# Patient Record
Sex: MALE | Race: WHITE | Employment: UNEMPLOYED
[De-identification: names, ages, dates, MRNs, and addresses within clinical notes are randomized per-mention and may not be internally consistent; named-entity substitution may affect disease eponyms.]

---

## 2024-10-16 ENCOUNTER — APPOINTMENT (OUTPATIENT)
Facility: HOSPITAL | Age: 64
DRG: 242 | End: 2024-10-16
Payer: MEDICAID

## 2024-10-16 ENCOUNTER — HOSPITAL ENCOUNTER (INPATIENT)
Facility: HOSPITAL | Age: 64
LOS: 20 days | Discharge: NURSING, REHAB OR LONG-TERM CARE | DRG: 242 | End: 2024-11-05
Attending: STUDENT IN AN ORGANIZED HEALTH CARE EDUCATION/TRAINING PROGRAM | Admitting: INTERNAL MEDICINE
Payer: MEDICAID

## 2024-10-16 DIAGNOSIS — N17.9 ACUTE RENAL FAILURE, UNSPECIFIED ACUTE RENAL FAILURE TYPE (HCC): ICD-10-CM
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
## 2024-10-16 DIAGNOSIS — F10.90 ALCOHOL USE DISORDER: ICD-10-CM

## 2024-10-16 DIAGNOSIS — R41.82 ALTERED MENTAL STATUS, UNSPECIFIED ALTERED MENTAL STATUS TYPE: ICD-10-CM

## 2024-10-16 PROBLEM — R56.9 ALCOHOL WITHDRAWAL SEIZURE WITH DELIRIUM (HCC): Status: ACTIVE | Noted: 2024-10-16

## 2024-10-16 PROBLEM — F10.931 ALCOHOL WITHDRAWAL SEIZURE WITH DELIRIUM (HCC): Status: ACTIVE | Noted: 2024-10-16

## 2024-10-16 LAB
ALBUMIN SERPL-MCNC: 3.6 G/DL (ref 3.5–5)
ALBUMIN/GLOB SERPL: 0.9 (ref 1.1–2.2)
ALP SERPL-CCNC: 66 U/L (ref 45–117)
ALT SERPL-CCNC: 28 U/L (ref 12–78)
AMMONIA PLAS-SCNC: 16 UMOL/L
ANION GAP BLD CALC-SCNC: 17 (ref 10–20)
ANION GAP SERPL CALC-SCNC: 25 MMOL/L (ref 2–12)
AST SERPL-CCNC: 35 U/L (ref 15–37)
BASE EXCESS BLD CALC-SCNC: 4.1 MMOL/L
BASOPHILS # BLD: 0 K/UL (ref 0–0.1)
BASOPHILS NFR BLD: 0 % (ref 0–1)
BILIRUB SERPL-MCNC: 1.3 MG/DL (ref 0.2–1)
BUN SERPL-MCNC: 132 MG/DL (ref 6–20)
BUN/CREAT SERPL: 32 (ref 12–20)
CA-I BLD-MCNC: 0.87 MMOL/L (ref 1.15–1.33)
CALCIUM SERPL-MCNC: 9.7 MG/DL (ref 8.5–10.1)
CHLORIDE BLD-SCNC: 91 MMOL/L (ref 100–111)
CHLORIDE SERPL-SCNC: 83 MMOL/L (ref 97–108)
CK SERPL-CCNC: 508 U/L (ref 39–308)
CO2 BLD-SCNC: 26 MMOL/L (ref 22–29)
CO2 SERPL-SCNC: 28 MMOL/L (ref 21–32)
CREAT SERPL-MCNC: 4.11 MG/DL (ref 0.7–1.3)
CREAT UR-MCNC: 2.7 MG/DL (ref 0.6–1.3)
DIFFERENTIAL METHOD BLD: ABNORMAL
EOSINOPHIL # BLD: 0 K/UL (ref 0–0.4)
EOSINOPHIL NFR BLD: 0 % (ref 0–7)
ERYTHROCYTE [DISTWIDTH] IN BLOOD BY AUTOMATED COUNT: 12.4 % (ref 11.5–14.5)
ETHANOL SERPL-MCNC: <10 MG/DL (ref 0–0.08)
GLOBULIN SER CALC-MCNC: 4 G/DL (ref 2–4)
GLUCOSE BLD STRIP.AUTO-MCNC: 229 MG/DL (ref 74–99)
GLUCOSE SERPL-MCNC: 242 MG/DL (ref 65–100)
HCO3 BLDA-SCNC: 26 MMOL/L
HCT VFR BLD AUTO: 32.8 % (ref 36.6–50.3)
HGB BLD-MCNC: 11.6 G/DL (ref 12.1–17)
IMM GRANULOCYTES # BLD AUTO: 0.3 K/UL (ref 0–0.04)
IMM GRANULOCYTES NFR BLD AUTO: 2 % (ref 0–0.5)
INR PPP: 1.4 (ref 0.9–1.1)
LACTATE BLD-SCNC: 14.27 MMOL/L (ref 0.4–2)
LACTATE BLD-SCNC: 3.04 MMOL/L (ref 0.4–2)
LACTATE BLD-SCNC: 7.78 MMOL/L (ref 0.4–2)
LIPASE SERPL-CCNC: 128 U/L (ref 13–75)
LYMPHOCYTES # BLD: 1.5 K/UL (ref 0.8–3.5)
LYMPHOCYTES NFR BLD: 9 % (ref 12–49)
MAGNESIUM SERPL-MCNC: 3.4 MG/DL (ref 1.6–2.4)
MCH RBC QN AUTO: 32 PG (ref 26–34)
MCHC RBC AUTO-ENTMCNC: 35.4 G/DL (ref 30–36.5)
MCV RBC AUTO: 90.6 FL (ref 80–99)
MONOCYTES # BLD: 0.5 K/UL (ref 0–1)
MONOCYTES NFR BLD: 3 % (ref 5–13)
NEUTS SEG # BLD: 14.6 K/UL (ref 1.8–8)
NEUTS SEG NFR BLD: 86 % (ref 32–75)
NRBC # BLD: 0.06 K/UL (ref 0–0.01)
NRBC BLD-RTO: 0.4 PER 100 WBC
NT PRO BNP: 3591 PG/ML
PCO2 BLDV: 31.2 MMHG (ref 41–51)
PH BLDV: 7.54 (ref 7.32–7.42)
PLATELET # BLD AUTO: 154 K/UL (ref 150–400)
PMV BLD AUTO: 11.3 FL (ref 8.9–12.9)
PO2 BLDV: <27 MMHG (ref 25–40)
POTASSIUM BLD-SCNC: 2.1 MMOL/L (ref 3.5–5.5)
POTASSIUM SERPL-SCNC: 2.1 MMOL/L (ref 3.5–5.1)
PROT SERPL-MCNC: 7.6 G/DL (ref 6.4–8.2)
PROTHROMBIN TIME: 14.7 SEC (ref 9–11.1)
RBC # BLD AUTO: 3.62 M/UL (ref 4.1–5.7)
SODIUM BLD-SCNC: 134 MMOL/L (ref 136–145)
SODIUM SERPL-SCNC: 136 MMOL/L (ref 136–145)
SPECIMEN SITE: ABNORMAL
TROPONIN I SERPL HS-MCNC: 56 NG/L (ref 0–76)
WBC # BLD AUTO: 16.9 K/UL (ref 4.1–11.1)

## 2024-10-16 PROCEDURE — 2500000003 HC RX 250 WO HCPCS: Performed by: NURSE PRACTITIONER

## 2024-10-16 PROCEDURE — 96365 THER/PROPH/DIAG IV INF INIT: CPT

## 2024-10-16 PROCEDURE — 74176 CT ABD & PELVIS W/O CONTRAST: CPT

## 2024-10-16 PROCEDURE — 80053 COMPREHEN METABOLIC PANEL: CPT

## 2024-10-16 PROCEDURE — 85610 PROTHROMBIN TIME: CPT

## 2024-10-16 PROCEDURE — 82947 ASSAY GLUCOSE BLOOD QUANT: CPT

## 2024-10-16 PROCEDURE — 84145 PROCALCITONIN (PCT): CPT

## 2024-10-16 PROCEDURE — 72125 CT NECK SPINE W/O DYE: CPT

## 2024-10-16 PROCEDURE — 82550 ASSAY OF CK (CPK): CPT

## 2024-10-16 PROCEDURE — 84295 ASSAY OF SERUM SODIUM: CPT

## 2024-10-16 PROCEDURE — 82803 BLOOD GASES ANY COMBINATION: CPT

## 2024-10-16 PROCEDURE — 71045 X-RAY EXAM CHEST 1 VIEW: CPT

## 2024-10-16 PROCEDURE — 82140 ASSAY OF AMMONIA: CPT

## 2024-10-16 PROCEDURE — 83735 ASSAY OF MAGNESIUM: CPT

## 2024-10-16 PROCEDURE — 6360000002 HC RX W HCPCS: Performed by: NURSE PRACTITIONER

## 2024-10-16 PROCEDURE — 70450 CT HEAD/BRAIN W/O DYE: CPT

## 2024-10-16 PROCEDURE — 36415 COLL VENOUS BLD VENIPUNCTURE: CPT

## 2024-10-16 PROCEDURE — 84484 ASSAY OF TROPONIN QUANT: CPT

## 2024-10-16 PROCEDURE — 82077 ASSAY SPEC XCP UR&BREATH IA: CPT

## 2024-10-16 PROCEDURE — 99285 EMERGENCY DEPT VISIT HI MDM: CPT

## 2024-10-16 PROCEDURE — 83880 ASSAY OF NATRIURETIC PEPTIDE: CPT

## 2024-10-16 PROCEDURE — 1100000000 HC RM PRIVATE

## 2024-10-16 PROCEDURE — 93005 ELECTROCARDIOGRAM TRACING: CPT | Performed by: STUDENT IN AN ORGANIZED HEALTH CARE EDUCATION/TRAINING PROGRAM

## 2024-10-16 PROCEDURE — 2580000003 HC RX 258: Performed by: STUDENT IN AN ORGANIZED HEALTH CARE EDUCATION/TRAINING PROGRAM

## 2024-10-16 PROCEDURE — 83690 ASSAY OF LIPASE: CPT

## 2024-10-16 PROCEDURE — 96375 TX/PRO/DX INJ NEW DRUG ADDON: CPT

## 2024-10-16 PROCEDURE — 84132 ASSAY OF SERUM POTASSIUM: CPT

## 2024-10-16 PROCEDURE — 83605 ASSAY OF LACTIC ACID: CPT

## 2024-10-16 PROCEDURE — 6370000000 HC RX 637 (ALT 250 FOR IP): Performed by: STUDENT IN AN ORGANIZED HEALTH CARE EDUCATION/TRAINING PROGRAM

## 2024-10-16 PROCEDURE — 87040 BLOOD CULTURE FOR BACTERIA: CPT

## 2024-10-16 PROCEDURE — 82330 ASSAY OF CALCIUM: CPT

## 2024-10-16 PROCEDURE — 85025 COMPLETE CBC W/AUTO DIFF WBC: CPT

## 2024-10-16 PROCEDURE — 6360000002 HC RX W HCPCS: Performed by: STUDENT IN AN ORGANIZED HEALTH CARE EDUCATION/TRAINING PROGRAM

## 2024-10-16 RX ORDER — PHENOBARBITAL SODIUM 65 MG/ML
32.5 INJECTION, SOLUTION INTRAMUSCULAR; INTRAVENOUS EVERY 6 HOURS PRN
Status: DISPENSED | OUTPATIENT
Start: 2024-10-16 | End: 2024-10-18

## 2024-10-16 RX ORDER — SODIUM CHLORIDE 9 MG/ML
INJECTION, SOLUTION INTRAVENOUS PRN
Status: DISCONTINUED | OUTPATIENT
Start: 2024-10-16 | End: 2024-11-04 | Stop reason: SDUPTHER

## 2024-10-16 RX ORDER — THIAMINE HYDROCHLORIDE 100 MG/ML
500 INJECTION, SOLUTION INTRAMUSCULAR; INTRAVENOUS EVERY 8 HOURS
Status: COMPLETED | OUTPATIENT
Start: 2024-10-16 | End: 2024-10-18

## 2024-10-16 RX ORDER — THIAMINE HYDROCHLORIDE 100 MG/ML
250 INJECTION, SOLUTION INTRAMUSCULAR; INTRAVENOUS EVERY 24 HOURS
Status: COMPLETED | OUTPATIENT
Start: 2024-10-18 | End: 2024-10-22

## 2024-10-16 RX ORDER — SODIUM CHLORIDE, SODIUM LACTATE, POTASSIUM CHLORIDE, AND CALCIUM CHLORIDE .6; .31; .03; .02 G/100ML; G/100ML; G/100ML; G/100ML
1000 INJECTION, SOLUTION INTRAVENOUS ONCE
Status: COMPLETED | OUTPATIENT
Start: 2024-10-16 | End: 2024-10-16

## 2024-10-16 RX ORDER — ACETAMINOPHEN 650 MG/1
650 SUPPOSITORY RECTAL EVERY 6 HOURS PRN
Status: DISCONTINUED | OUTPATIENT
Start: 2024-10-16 | End: 2024-11-05 | Stop reason: HOSPADM

## 2024-10-16 RX ORDER — PHENOBARBITAL 32.4 MG/1
32.4 TABLET ORAL EVERY 6 HOURS PRN
Status: DISCONTINUED | OUTPATIENT
Start: 2024-10-16 | End: 2024-10-16

## 2024-10-16 RX ORDER — PHENOBARBITAL SODIUM 65 MG/ML
16.2 INJECTION, SOLUTION INTRAMUSCULAR; INTRAVENOUS EVERY 6 HOURS PRN
Status: DISPENSED | OUTPATIENT
Start: 2024-10-18 | End: 2024-10-20

## 2024-10-16 RX ORDER — ACETAMINOPHEN 325 MG/1
650 TABLET ORAL EVERY 6 HOURS PRN
Status: DISCONTINUED | OUTPATIENT
Start: 2024-10-16 | End: 2024-11-05 | Stop reason: HOSPADM

## 2024-10-16 RX ORDER — DEXTROSE MONOHYDRATE, SODIUM CHLORIDE, AND POTASSIUM CHLORIDE 50; 1.49; 4.5 G/1000ML; G/1000ML; G/1000ML
INJECTION, SOLUTION INTRAVENOUS CONTINUOUS
Status: DISCONTINUED | OUTPATIENT
Start: 2024-10-16 | End: 2024-10-17

## 2024-10-16 RX ORDER — M-VIT,TX,IRON,MINS/CALC/FOLIC 27MG-0.4MG
1 TABLET ORAL DAILY
Status: DISCONTINUED | OUTPATIENT
Start: 2024-10-17 | End: 2024-11-05 | Stop reason: HOSPADM

## 2024-10-16 RX ORDER — ONDANSETRON 2 MG/ML
4 INJECTION INTRAMUSCULAR; INTRAVENOUS EVERY 6 HOURS PRN
Status: DISCONTINUED | OUTPATIENT
Start: 2024-10-16 | End: 2024-11-05 | Stop reason: HOSPADM

## 2024-10-16 RX ORDER — ENOXAPARIN SODIUM 100 MG/ML
30 INJECTION SUBCUTANEOUS DAILY
Status: DISCONTINUED | OUTPATIENT
Start: 2024-10-17 | End: 2024-11-03

## 2024-10-16 RX ORDER — SODIUM CHLORIDE 0.9 % (FLUSH) 0.9 %
5-40 SYRINGE (ML) INJECTION PRN
Status: DISCONTINUED | OUTPATIENT
Start: 2024-10-16 | End: 2024-10-25

## 2024-10-16 RX ORDER — POTASSIUM CHLORIDE 7.45 MG/ML
10 INJECTION INTRAVENOUS
Status: COMPLETED | OUTPATIENT
Start: 2024-10-16 | End: 2024-10-16

## 2024-10-16 RX ORDER — PHENOBARBITAL 32.4 MG/1
16.2 TABLET ORAL EVERY 6 HOURS PRN
Status: DISCONTINUED | OUTPATIENT
Start: 2024-10-18 | End: 2024-10-16

## 2024-10-16 RX ORDER — SODIUM CHLORIDE 0.9 % (FLUSH) 0.9 %
5-40 SYRINGE (ML) INJECTION EVERY 12 HOURS SCHEDULED
Status: DISCONTINUED | OUTPATIENT
Start: 2024-10-16 | End: 2024-10-25

## 2024-10-16 RX ORDER — 0.9 % SODIUM CHLORIDE 0.9 %
1000 INTRAVENOUS SOLUTION INTRAVENOUS ONCE
Status: COMPLETED | OUTPATIENT
Start: 2024-10-16 | End: 2024-10-16

## 2024-10-16 RX ORDER — POLYETHYLENE GLYCOL 3350 17 G/17G
17 POWDER, FOR SOLUTION ORAL DAILY PRN
Status: DISCONTINUED | OUTPATIENT
Start: 2024-10-16 | End: 2024-11-05 | Stop reason: HOSPADM

## 2024-10-16 RX ORDER — FOLIC ACID 1 MG/1
1 TABLET ORAL DAILY
Status: DISCONTINUED | OUTPATIENT
Start: 2024-10-17 | End: 2024-11-05 | Stop reason: HOSPADM

## 2024-10-16 RX ORDER — GAUZE BANDAGE 2" X 2"
100 BANDAGE TOPICAL DAILY
Status: DISCONTINUED | OUTPATIENT
Start: 2024-10-23 | End: 2024-11-05 | Stop reason: HOSPADM

## 2024-10-16 RX ORDER — ONDANSETRON 4 MG/1
4 TABLET, ORALLY DISINTEGRATING ORAL EVERY 8 HOURS PRN
Status: DISCONTINUED | OUTPATIENT
Start: 2024-10-16 | End: 2024-11-05 | Stop reason: HOSPADM

## 2024-10-16 RX ORDER — POTASSIUM CHLORIDE 1500 MG/1
40 TABLET, EXTENDED RELEASE ORAL ONCE
Status: DISCONTINUED | OUTPATIENT
Start: 2024-10-16 | End: 2024-10-17

## 2024-10-16 RX ORDER — SODIUM CHLORIDE, SODIUM LACTATE, POTASSIUM CHLORIDE, AND CALCIUM CHLORIDE .6; .31; .03; .02 G/100ML; G/100ML; G/100ML; G/100ML
1000 INJECTION, SOLUTION INTRAVENOUS ONCE
Status: DISCONTINUED | OUTPATIENT
Start: 2024-10-16 | End: 2024-10-16

## 2024-10-16 RX ADMIN — POTASSIUM CHLORIDE 10 MEQ: 7.46 INJECTION, SOLUTION INTRAVENOUS at 20:47

## 2024-10-16 RX ADMIN — WATER 2000 MG: 1 INJECTION INTRAMUSCULAR; INTRAVENOUS; SUBCUTANEOUS at 19:24

## 2024-10-16 RX ADMIN — POTASSIUM CHLORIDE 10 MEQ: 7.46 INJECTION, SOLUTION INTRAVENOUS at 21:46

## 2024-10-16 RX ADMIN — POTASSIUM CHLORIDE 10 MEQ: 7.46 INJECTION, SOLUTION INTRAVENOUS at 22:48

## 2024-10-16 RX ADMIN — POTASSIUM CHLORIDE 10 MEQ: 7.46 INJECTION, SOLUTION INTRAVENOUS at 23:49

## 2024-10-16 RX ADMIN — THIAMINE HYDROCHLORIDE 500 MG: 100 INJECTION, SOLUTION INTRAMUSCULAR; INTRAVENOUS at 22:52

## 2024-10-16 RX ADMIN — SODIUM CHLORIDE 1000 ML: 9 INJECTION, SOLUTION INTRAVENOUS at 19:39

## 2024-10-16 RX ADMIN — POTASSIUM CHLORIDE, DEXTROSE MONOHYDRATE AND SODIUM CHLORIDE: 150; 5; 450 INJECTION, SOLUTION INTRAVENOUS at 22:50

## 2024-10-16 RX ADMIN — SODIUM CHLORIDE, POTASSIUM CHLORIDE, SODIUM LACTATE AND CALCIUM CHLORIDE 1000 ML: 600; 310; 30; 20 INJECTION, SOLUTION INTRAVENOUS at 19:17

## 2024-10-16 NOTE — ED NOTES
Second bag of Lactated Ringer's switched to Normal Saline because EMS had already spiked a bag of NS and started administering it. This was discussed with Dr. Harvey and agreed to. This was due to fluid supply issues.

## 2024-10-17 PROBLEM — E43 SEVERE PROTEIN-CALORIE MALNUTRITION (HCC): Status: ACTIVE | Noted: 2024-10-17

## 2024-10-17 LAB
ALBUMIN SERPL-MCNC: 2.3 G/DL (ref 3.5–5)
ALBUMIN SERPL-MCNC: 2.4 G/DL (ref 3.5–5)
ALBUMIN SERPL-MCNC: 2.4 G/DL (ref 3.5–5)
ALBUMIN SERPL-MCNC: 2.6 G/DL (ref 3.5–5)
ALBUMIN SERPL-MCNC: 2.7 G/DL (ref 3.5–5)
ALBUMIN/GLOB SERPL: 0.8 (ref 1.1–2.2)
ALP SERPL-CCNC: 40 U/L (ref 45–117)
ALP SERPL-CCNC: 42 U/L (ref 45–117)
ALP SERPL-CCNC: 45 U/L (ref 45–117)
ALP SERPL-CCNC: 47 U/L (ref 45–117)
ALP SERPL-CCNC: 48 U/L (ref 45–117)
ALT SERPL-CCNC: 19 U/L (ref 12–78)
ALT SERPL-CCNC: 19 U/L (ref 12–78)
ALT SERPL-CCNC: 20 U/L (ref 12–78)
ALT SERPL-CCNC: 21 U/L (ref 12–78)
ALT SERPL-CCNC: 22 U/L (ref 12–78)
AMPHET UR QL SCN: NEGATIVE
ANION GAP SERPL CALC-SCNC: 11 MMOL/L (ref 2–12)
ANION GAP SERPL CALC-SCNC: 13 MMOL/L (ref 2–12)
ANION GAP SERPL CALC-SCNC: 5 MMOL/L (ref 2–12)
ANION GAP SERPL CALC-SCNC: 5 MMOL/L (ref 2–12)
ANION GAP SERPL CALC-SCNC: 7 MMOL/L (ref 2–12)
APPEARANCE UR: ABNORMAL
AST SERPL-CCNC: 29 U/L (ref 15–37)
AST SERPL-CCNC: 31 U/L (ref 15–37)
AST SERPL-CCNC: 32 U/L (ref 15–37)
AST SERPL-CCNC: 33 U/L (ref 15–37)
AST SERPL-CCNC: 35 U/L (ref 15–37)
BACTERIA URNS QL MICRO: NEGATIVE /HPF
BARBITURATES UR QL SCN: NEGATIVE
BASOPHILS # BLD: 0 K/UL (ref 0–0.1)
BASOPHILS NFR BLD: 0 % (ref 0–1)
BENZODIAZ UR QL: NEGATIVE
BILIRUB SERPL-MCNC: 0.8 MG/DL (ref 0.2–1)
BILIRUB SERPL-MCNC: 0.9 MG/DL (ref 0.2–1)
BILIRUB SERPL-MCNC: 0.9 MG/DL (ref 0.2–1)
BILIRUB SERPL-MCNC: 1 MG/DL (ref 0.2–1)
BILIRUB SERPL-MCNC: 1.1 MG/DL (ref 0.2–1)
BILIRUB UR QL: NEGATIVE
BUN SERPL-MCNC: 117 MG/DL (ref 6–20)
BUN SERPL-MCNC: 119 MG/DL (ref 6–20)
BUN SERPL-MCNC: 123 MG/DL (ref 6–20)
BUN SERPL-MCNC: 124 MG/DL (ref 6–20)
BUN SERPL-MCNC: 137 MG/DL (ref 6–20)
BUN/CREAT SERPL: 37 (ref 12–20)
BUN/CREAT SERPL: 37 (ref 12–20)
BUN/CREAT SERPL: 38 (ref 12–20)
CALCIUM SERPL-MCNC: 7.6 MG/DL (ref 8.5–10.1)
CALCIUM SERPL-MCNC: 7.6 MG/DL (ref 8.5–10.1)
CALCIUM SERPL-MCNC: 7.7 MG/DL (ref 8.5–10.1)
CALCIUM SERPL-MCNC: 7.8 MG/DL (ref 8.5–10.1)
CALCIUM SERPL-MCNC: 8.2 MG/DL (ref 8.5–10.1)
CANNABINOIDS UR QL SCN: NEGATIVE
CHLORIDE SERPL-SCNC: 100 MMOL/L (ref 97–108)
CHLORIDE SERPL-SCNC: 101 MMOL/L (ref 97–108)
CHLORIDE SERPL-SCNC: 90 MMOL/L (ref 97–108)
CHLORIDE SERPL-SCNC: 96 MMOL/L (ref 97–108)
CHLORIDE SERPL-SCNC: 99 MMOL/L (ref 97–108)
CO2 SERPL-SCNC: 33 MMOL/L (ref 21–32)
CO2 SERPL-SCNC: 33 MMOL/L (ref 21–32)
CO2 SERPL-SCNC: 34 MMOL/L (ref 21–32)
CO2 SERPL-SCNC: 35 MMOL/L (ref 21–32)
CO2 SERPL-SCNC: 36 MMOL/L (ref 21–32)
COCAINE UR QL SCN: NEGATIVE
COLOR UR: ABNORMAL
CREAT SERPL-MCNC: 3.07 MG/DL (ref 0.7–1.3)
CREAT SERPL-MCNC: 3.13 MG/DL (ref 0.7–1.3)
CREAT SERPL-MCNC: 3.26 MG/DL (ref 0.7–1.3)
CREAT SERPL-MCNC: 3.39 MG/DL (ref 0.7–1.3)
CREAT SERPL-MCNC: 3.66 MG/DL (ref 0.7–1.3)
DIFFERENTIAL METHOD BLD: ABNORMAL
EOSINOPHIL # BLD: 0 K/UL (ref 0–0.4)
EOSINOPHIL NFR BLD: 0 % (ref 0–7)
EPITH CASTS URNS QL MICRO: ABNORMAL /LPF
ERYTHROCYTE [DISTWIDTH] IN BLOOD BY AUTOMATED COUNT: 12.4 % (ref 11.5–14.5)
GLOBULIN SER CALC-MCNC: 2.9 G/DL (ref 2–4)
GLOBULIN SER CALC-MCNC: 2.9 G/DL (ref 2–4)
GLOBULIN SER CALC-MCNC: 3.2 G/DL (ref 2–4)
GLOBULIN SER CALC-MCNC: 3.2 G/DL (ref 2–4)
GLOBULIN SER CALC-MCNC: 3.3 G/DL (ref 2–4)
GLUCOSE BLD STRIP.AUTO-MCNC: 112 MG/DL (ref 65–117)
GLUCOSE BLD STRIP.AUTO-MCNC: 121 MG/DL (ref 65–117)
GLUCOSE BLD STRIP.AUTO-MCNC: 156 MG/DL (ref 65–117)
GLUCOSE BLD STRIP.AUTO-MCNC: 189 MG/DL (ref 65–117)
GLUCOSE BLD STRIP.AUTO-MCNC: 199 MG/DL (ref 65–117)
GLUCOSE SERPL-MCNC: 107 MG/DL (ref 65–100)
GLUCOSE SERPL-MCNC: 111 MG/DL (ref 65–100)
GLUCOSE SERPL-MCNC: 124 MG/DL (ref 65–100)
GLUCOSE SERPL-MCNC: 164 MG/DL (ref 65–100)
GLUCOSE SERPL-MCNC: 204 MG/DL (ref 65–100)
GLUCOSE UR STRIP.AUTO-MCNC: NEGATIVE MG/DL
GRAN CASTS URNS QL MICRO: ABNORMAL /LPF
HCT VFR BLD AUTO: 22 % (ref 36.6–50.3)
HCT VFR BLD AUTO: 23.9 % (ref 36.6–50.3)
HGB BLD-MCNC: 7.8 G/DL (ref 12.1–17)
HGB BLD-MCNC: 8.4 G/DL (ref 12.1–17)
HGB UR QL STRIP: ABNORMAL
HYALINE CASTS URNS QL MICRO: ABNORMAL /LPF (ref 0–5)
IMM GRANULOCYTES # BLD AUTO: 0.1 K/UL (ref 0–0.04)
IMM GRANULOCYTES NFR BLD AUTO: 1 % (ref 0–0.5)
KETONES UR QL STRIP.AUTO: NEGATIVE MG/DL
LACTATE BLD-SCNC: 1.57 MMOL/L (ref 0.4–2)
LEUKOCYTE ESTERASE UR QL STRIP.AUTO: ABNORMAL
LYMPHOCYTES # BLD: 1.4 K/UL (ref 0.8–3.5)
LYMPHOCYTES NFR BLD: 11 % (ref 12–49)
Lab: NORMAL
MAGNESIUM SERPL-MCNC: 2.3 MG/DL (ref 1.6–2.4)
MAGNESIUM SERPL-MCNC: 2.6 MG/DL (ref 1.6–2.4)
MCH RBC QN AUTO: 31.8 PG (ref 26–34)
MCHC RBC AUTO-ENTMCNC: 35.1 G/DL (ref 30–36.5)
MCV RBC AUTO: 90.5 FL (ref 80–99)
METHADONE UR QL: NEGATIVE
MONOCYTES # BLD: 0.3 K/UL (ref 0–1)
MONOCYTES NFR BLD: 3 % (ref 5–13)
NEUTS SEG # BLD: 10.6 K/UL (ref 1.8–8)
NEUTS SEG NFR BLD: 85 % (ref 32–75)
NITRITE UR QL STRIP.AUTO: NEGATIVE
NRBC # BLD: 0.02 K/UL (ref 0–0.01)
NRBC BLD-RTO: 0.2 PER 100 WBC
OPIATES UR QL: NEGATIVE
OTHER: ABNORMAL
PCP UR QL: NEGATIVE
PH UR STRIP: 5 (ref 5–8)
PHOSPHATE SERPL-MCNC: 2 MG/DL (ref 2.6–4.7)
PHOSPHATE SERPL-MCNC: 2.6 MG/DL (ref 2.6–4.7)
PLATELET # BLD AUTO: 122 K/UL (ref 150–400)
PMV BLD AUTO: 11.2 FL (ref 8.9–12.9)
POTASSIUM SERPL-SCNC: 2.4 MMOL/L (ref 3.5–5.1)
POTASSIUM SERPL-SCNC: 2.4 MMOL/L (ref 3.5–5.1)
POTASSIUM SERPL-SCNC: 3 MMOL/L (ref 3.5–5.1)
POTASSIUM SERPL-SCNC: 3.7 MMOL/L (ref 3.5–5.1)
POTASSIUM SERPL-SCNC: 4 MMOL/L (ref 3.5–5.1)
PROCALCITONIN SERPL-MCNC: 1.09 NG/ML
PROT SERPL-MCNC: 5.2 G/DL (ref 6.4–8.2)
PROT SERPL-MCNC: 5.3 G/DL (ref 6.4–8.2)
PROT SERPL-MCNC: 5.6 G/DL (ref 6.4–8.2)
PROT SERPL-MCNC: 5.8 G/DL (ref 6.4–8.2)
PROT SERPL-MCNC: 6 G/DL (ref 6.4–8.2)
PROT UR STRIP-MCNC: 30 MG/DL
RBC # BLD AUTO: 2.64 M/UL (ref 4.1–5.7)
RBC #/AREA URNS HPF: ABNORMAL /HPF (ref 0–5)
SERVICE CMNT-IMP: ABNORMAL
SERVICE CMNT-IMP: NORMAL
SODIUM SERPL-SCNC: 139 MMOL/L (ref 136–145)
SODIUM SERPL-SCNC: 140 MMOL/L (ref 136–145)
SODIUM SERPL-SCNC: 141 MMOL/L (ref 136–145)
SP GR UR REFRACTOMETRY: 1.02
URATE CRY URNS QL MICRO: ABNORMAL
URINE CULTURE IF INDICATED: ABNORMAL
UROBILINOGEN UR QL STRIP.AUTO: 1 EU/DL (ref 0.2–1)
WBC # BLD AUTO: 12.4 K/UL (ref 4.1–11.1)
WBC URNS QL MICRO: ABNORMAL /HPF (ref 0–4)

## 2024-10-17 PROCEDURE — 6360000002 HC RX W HCPCS: Performed by: NURSE PRACTITIONER

## 2024-10-17 PROCEDURE — 6370000000 HC RX 637 (ALT 250 FOR IP): Performed by: INTERNAL MEDICINE

## 2024-10-17 PROCEDURE — 82962 GLUCOSE BLOOD TEST: CPT

## 2024-10-17 PROCEDURE — 81001 URINALYSIS AUTO W/SCOPE: CPT

## 2024-10-17 PROCEDURE — 2500000003 HC RX 250 WO HCPCS: Performed by: NURSE PRACTITIONER

## 2024-10-17 PROCEDURE — 83605 ASSAY OF LACTIC ACID: CPT

## 2024-10-17 PROCEDURE — 83735 ASSAY OF MAGNESIUM: CPT

## 2024-10-17 PROCEDURE — 84100 ASSAY OF PHOSPHORUS: CPT

## 2024-10-17 PROCEDURE — 6370000000 HC RX 637 (ALT 250 FOR IP): Performed by: NURSE PRACTITIONER

## 2024-10-17 PROCEDURE — 36415 COLL VENOUS BLD VENIPUNCTURE: CPT

## 2024-10-17 PROCEDURE — 2580000003 HC RX 258: Performed by: INTERNAL MEDICINE

## 2024-10-17 PROCEDURE — 85014 HEMATOCRIT: CPT

## 2024-10-17 PROCEDURE — 2580000003 HC RX 258: Performed by: NURSE PRACTITIONER

## 2024-10-17 PROCEDURE — 80307 DRUG TEST PRSMV CHEM ANLYZR: CPT

## 2024-10-17 PROCEDURE — 85018 HEMOGLOBIN: CPT

## 2024-10-17 PROCEDURE — 85025 COMPLETE CBC W/AUTO DIFF WBC: CPT

## 2024-10-17 PROCEDURE — 2000000000 HC ICU R&B

## 2024-10-17 PROCEDURE — 80053 COMPREHEN METABOLIC PANEL: CPT

## 2024-10-17 RX ORDER — GLUCAGON 1 MG/ML
1 KIT INJECTION PRN
Status: DISCONTINUED | OUTPATIENT
Start: 2024-10-17 | End: 2024-11-05 | Stop reason: HOSPADM

## 2024-10-17 RX ORDER — INSULIN LISPRO 100 [IU]/ML
0-4 INJECTION, SOLUTION INTRAVENOUS; SUBCUTANEOUS
Status: DISCONTINUED | OUTPATIENT
Start: 2024-10-17 | End: 2024-11-03

## 2024-10-17 RX ORDER — POTASSIUM CHLORIDE 1.5 G/1.58G
40 POWDER, FOR SOLUTION ORAL ONCE
Status: COMPLETED | OUTPATIENT
Start: 2024-10-17 | End: 2024-10-17

## 2024-10-17 RX ORDER — SODIUM CHLORIDE, SODIUM LACTATE, POTASSIUM CHLORIDE, CALCIUM CHLORIDE 600; 310; 30; 20 MG/100ML; MG/100ML; MG/100ML; MG/100ML
INJECTION, SOLUTION INTRAVENOUS CONTINUOUS
Status: DISCONTINUED | OUTPATIENT
Start: 2024-10-17 | End: 2024-10-21

## 2024-10-17 RX ORDER — POTASSIUM CHLORIDE 7.45 MG/ML
10 INJECTION INTRAVENOUS
Status: COMPLETED | OUTPATIENT
Start: 2024-10-17 | End: 2024-10-17

## 2024-10-17 RX ORDER — DEXTROSE MONOHYDRATE 100 MG/ML
INJECTION, SOLUTION INTRAVENOUS CONTINUOUS PRN
Status: DISCONTINUED | OUTPATIENT
Start: 2024-10-17 | End: 2024-11-05 | Stop reason: HOSPADM

## 2024-10-17 RX ORDER — POTASSIUM CHLORIDE 7.45 MG/ML
10 INJECTION INTRAVENOUS
Status: COMPLETED | OUTPATIENT
Start: 2024-10-17 | End: 2024-10-18

## 2024-10-17 RX ORDER — SODIUM CHLORIDE, SODIUM LACTATE, POTASSIUM CHLORIDE, AND CALCIUM CHLORIDE .6; .31; .03; .02 G/100ML; G/100ML; G/100ML; G/100ML
1000 INJECTION, SOLUTION INTRAVENOUS ONCE
Status: COMPLETED | OUTPATIENT
Start: 2024-10-17 | End: 2024-10-17

## 2024-10-17 RX ADMIN — POTASSIUM CHLORIDE 10 MEQ: 10 INJECTION, SOLUTION INTRAVENOUS at 06:25

## 2024-10-17 RX ADMIN — POTASSIUM CHLORIDE 10 MEQ: 7.46 INJECTION, SOLUTION INTRAVENOUS at 23:19

## 2024-10-17 RX ADMIN — SODIUM CHLORIDE, POTASSIUM CHLORIDE, SODIUM LACTATE AND CALCIUM CHLORIDE: 600; 310; 30; 20 INJECTION, SOLUTION INTRAVENOUS at 14:07

## 2024-10-17 RX ADMIN — THIAMINE HYDROCHLORIDE 500 MG: 100 INJECTION, SOLUTION INTRAMUSCULAR; INTRAVENOUS at 06:20

## 2024-10-17 RX ADMIN — PHENOBARBITAL SODIUM 32.5 MG: 65 INJECTION, SOLUTION INTRAMUSCULAR; INTRAVENOUS at 04:52

## 2024-10-17 RX ADMIN — POTASSIUM CHLORIDE 10 MEQ: 10 INJECTION, SOLUTION INTRAVENOUS at 02:34

## 2024-10-17 RX ADMIN — POTASSIUM CHLORIDE 10 MEQ: 10 INJECTION, SOLUTION INTRAVENOUS at 05:20

## 2024-10-17 RX ADMIN — Medication 1 AMPULE: at 20:07

## 2024-10-17 RX ADMIN — POTASSIUM CHLORIDE 10 MEQ: 7.46 INJECTION, SOLUTION INTRAVENOUS at 21:31

## 2024-10-17 RX ADMIN — THIAMINE HYDROCHLORIDE 500 MG: 100 INJECTION, SOLUTION INTRAMUSCULAR; INTRAVENOUS at 13:56

## 2024-10-17 RX ADMIN — SODIUM CHLORIDE, POTASSIUM CHLORIDE, SODIUM LACTATE AND CALCIUM CHLORIDE 1000 ML: 600; 310; 30; 20 INJECTION, SOLUTION INTRAVENOUS at 04:19

## 2024-10-17 RX ADMIN — SODIUM CHLORIDE, PRESERVATIVE FREE 10 ML: 5 INJECTION INTRAVENOUS at 20:08

## 2024-10-17 RX ADMIN — POTASSIUM CHLORIDE 10 MEQ: 10 INJECTION, SOLUTION INTRAVENOUS at 11:57

## 2024-10-17 RX ADMIN — SODIUM CHLORIDE, POTASSIUM CHLORIDE, SODIUM LACTATE AND CALCIUM CHLORIDE: 600; 310; 30; 20 INJECTION, SOLUTION INTRAVENOUS at 20:29

## 2024-10-17 RX ADMIN — SODIUM CHLORIDE, POTASSIUM CHLORIDE, SODIUM LACTATE AND CALCIUM CHLORIDE 1000 ML: 600; 310; 30; 20 INJECTION, SOLUTION INTRAVENOUS at 12:01

## 2024-10-17 RX ADMIN — POTASSIUM CHLORIDE 20 MEQ: 1.5 FOR SOLUTION ORAL at 05:16

## 2024-10-17 RX ADMIN — SODIUM CHLORIDE, PRESERVATIVE FREE 10 ML: 5 INJECTION INTRAVENOUS at 09:09

## 2024-10-17 RX ADMIN — POTASSIUM CHLORIDE 10 MEQ: 10 INJECTION, SOLUTION INTRAVENOUS at 09:06

## 2024-10-17 RX ADMIN — THIAMINE HYDROCHLORIDE 500 MG: 100 INJECTION, SOLUTION INTRAMUSCULAR; INTRAVENOUS at 20:09

## 2024-10-17 RX ADMIN — POTASSIUM CHLORIDE 10 MEQ: 10 INJECTION, SOLUTION INTRAVENOUS at 04:09

## 2024-10-17 RX ADMIN — INSULIN LISPRO 1 UNITS: 100 INJECTION, SOLUTION INTRAVENOUS; SUBCUTANEOUS at 13:00

## 2024-10-17 RX ADMIN — POTASSIUM CHLORIDE, DEXTROSE MONOHYDRATE AND SODIUM CHLORIDE: 150; 5; 450 INJECTION, SOLUTION INTRAVENOUS at 09:21

## 2024-10-17 RX ADMIN — Medication 1 AMPULE: at 09:00

## 2024-10-17 RX ADMIN — ENOXAPARIN SODIUM 30 MG: 100 INJECTION SUBCUTANEOUS at 09:23

## 2024-10-17 RX ADMIN — INSULIN LISPRO 1 UNITS: 100 INJECTION, SOLUTION INTRAVENOUS; SUBCUTANEOUS at 04:40

## 2024-10-17 NOTE — H&P
1530 - CM called  KELLY BRIGGS (Brother/Sister) [phone]  to complete assessment but no answer. Cm left voicemail. CM spoke with the patient's spouse Kelly Galicia [phone] who did not have time to talk but said she would call back to complete assessment.    1545 - KELLY BRIGGS (Brother/Sister) [phone] called back but he did not have much detailed information about the patient's history. CM will follow up with Kelly Galicia [phone]. CM will continue to follow.     Jeannine Vines, LUISAN, RN, [facility]  X [phone]

## 2024-10-17 NOTE — ED NOTES
TRANSFER - OUT REPORT:    Verbal report given to Harmeet on Getachew Durham  being transferred to [facility] for routine progression of patient care       Report consisted of patient's Situation, Background, Assessment and   Recommendations(SBAR).     Information from the following report(s) Nurse Handoff Report, Index, ED Encounter Summary, ED SBAR, Adult Overview, Intake/Output, MAR, Recent Results, Med Rec Status, and Cardiac Rhythm nsr w/ PVCs  was reviewed with the receiving nurse.    [place] Fall Assessment:    Presents to emergency department  because of falls (Syncope, seizure, or loss of consciousness): No  Age > 70: No  Altered Mental Status, Intoxication with alcohol or substance confusion (Disorientation, impaired judgment, poor safety awaremess, or inability to follow instructions): Yes  Impaired Mobility: Ambulates or transfers with assistive devices or assistance; Unable to ambulate or transer.: No  Nursing Judgement: Yes          Lines:   Peripheral IV 10/16/24 Left Cephalic (Active)        Opportunity for questions and clarification was provided.      Patient transported with:  Monitor and Registered Nurse

## 2024-10-17 NOTE — ED PROVIDER NOTES
EMERGENCY DEPARTMENT HISTORY AND PHYSICAL EXAM      Date: 10/16/2024  Patient Name: Getachew Durham    History of Presenting Illness     Chief Complaint   Patient presents with    Altered Mental Status     Pt arrives by EMS for AMS. EMS reports that patient's wife found him on floor at the bottom of the stairs. EMS states that patient reported tripping on first step on the stairs at home. They report AMS increasing in route. Pt's wife reports patient does not take medications and she does not live with the patient.         HPI: History From: Wife, patient, History limited by: Altered mental status   Getachew Durham, 64 y.o. male presents to the ED with cc of found on the ground, altered mental status.  I am able to get some history from his wife, she states that she does not live with him, she found him down on the ground today, he reports that he fell.  He tells me he does not know what happened, he passed out and is not sure any other details of the event.  He is able to tell me his name, otherwise his speech is slow and latent and he is a very poor historian.  He says no when I ask if he has any chest pain, abdominal pain, head pain, vomiting or diarrhea.  He does report poor oral intake recently.  He smokes cigarettes, and states that he drinks alcohol.  He does not tell me how much, however his wife states she believes he drinks at least 1/5 of liquor a day.  No other drug use.  He does not have any known past medical history, however does not have a doctor, and wife states he never goes to the doctor.  He does not take any daily medications.          There are no other complaints, changes, or physical findings at this time.    PCP: No primary care provider on file.    No current facility-administered medications on file prior to encounter.     No current outpatient medications on file prior to encounter.       Past History     Past Medical History:  Past Medical History:   Diagnosis Date    Alcohol abuse

## 2024-10-17 NOTE — H&P
[facility]    ICU TEAM H & P Note    Name: Getachew Durham   : 1960   MRN: [id number]   Date: 10/16/2024        Subjective:     Reason for ICU Admission: This is a 63 y/o M with no known PMHx other than ETOH abuse (1/5/day) and tobacco use who presented to ER on 10/16/24 after his wife (does not live with him) found him at the bottom of the stairs after an unknown downtime. He reported that he fell but is unable to provide details 2/2 confusion. Upon further workup he was noted to have K 2.1, chloride 83, BUN/creat 132/4.11 Ag 25, lactic acid 14.27, BNP 3,591 with WBC 17. He was given 2 liters LR as well as cefepime for concerns of sepsis.   On exam he is disoriented to everything except name and , no ataxia and mild tremors present at finger tips with fingers outstretched. Vitals stable. Orders placed, will admit to ICU for ongoing care 2/2 hypokalemia.    Past Medical History:      has a past medical history of Alcohol abuse.    Past Surgical History:      has no past surgical history on file.    Home Medications:     Prior to Admission medications    Not on File     Allergies/Social/Family History:     No Known Allergies   Social History     Tobacco Use    Smoking status: Not on file    Smokeless tobacco: Not on file   Substance Use Topics    Alcohol use: Not on file      No family history on file.    Review of Systems:   Review of Systems   Unable to perform ROS: Mental status change     Objective:   Vital Signs:  BP (!) 116/91   Pulse 66   Temp 97.6 °F (36.4 °C) (Oral)   Resp 14   Ht 1.803 m (5' 11\")   Wt 55.6 kg (122 lb 9.2 oz)   SpO2 96%   BMI 17.10 kg/m²      Temp (24hrs), Av.6 °F (36.4 °C), Min:97.6 °F (36.4 °C), Max:97.6 °F (36.4 °C)         Intake/Output:     Intake/Output Summary (Last 24 hours) at 10/16/2024 2152  Last data filed at 10/16/2024 2143  Gross per 24 hour   Intake 2090.24 ml   Output --   Net 2090.24 ml       Physical Exam:  Physical Exam  Vitals and nursing

## 2024-10-17 NOTE — INTERDISCIPLINARY ROUNDS
Critical care interdisciplinary rounds today.  Following members present: Case Management, , Clinical Lead, Diabetes Team, Nursing, Nutrition, Pharmacy, and Physician. Family invited to participate.  Plan of care discussed.  See clinical pathway for plan of care and interventions and desired outcomes.    Pt. Remains in restraints due to pulling out lines.     Per Dr. Agarwal, Insert gonzalez catheter for retention.

## 2024-10-18 ENCOUNTER — ANESTHESIA EVENT (OUTPATIENT)
Facility: HOSPITAL | Age: 64
End: 2024-10-18

## 2024-10-18 ENCOUNTER — ANESTHESIA (OUTPATIENT)
Facility: HOSPITAL | Age: 64
End: 2024-10-18

## 2024-10-18 LAB
ALBUMIN SERPL-MCNC: 2.3 G/DL (ref 3.5–5)
ALBUMIN SERPL-MCNC: 2.4 G/DL (ref 3.5–5)
ALBUMIN SERPL-MCNC: 2.4 G/DL (ref 3.5–5)
ALBUMIN/GLOB SERPL: 0.8 (ref 1.1–2.2)
ALBUMIN/GLOB SERPL: 0.9 (ref 1.1–2.2)
ALBUMIN/GLOB SERPL: 0.9 (ref 1.1–2.2)
ALP SERPL-CCNC: 44 U/L (ref 45–117)
ALP SERPL-CCNC: 45 U/L (ref 45–117)
ALP SERPL-CCNC: 48 U/L (ref 45–117)
ALT SERPL-CCNC: 22 U/L (ref 12–78)
ALT SERPL-CCNC: 26 U/L (ref 12–78)
ALT SERPL-CCNC: 27 U/L (ref 12–78)
ANION GAP SERPL CALC-SCNC: 4 MMOL/L (ref 2–12)
ANION GAP SERPL CALC-SCNC: 6 MMOL/L (ref 2–12)
ANION GAP SERPL CALC-SCNC: 8 MMOL/L (ref 2–12)
AST SERPL-CCNC: 41 U/L (ref 15–37)
AST SERPL-CCNC: 43 U/L (ref 15–37)
AST SERPL-CCNC: 44 U/L (ref 15–37)
BASOPHILS # BLD: 0 K/UL (ref 0–0.1)
BASOPHILS # BLD: 0 K/UL (ref 0–0.1)
BASOPHILS NFR BLD: 0 % (ref 0–1)
BASOPHILS NFR BLD: 0 % (ref 0–1)
BILIRUB SERPL-MCNC: 0.7 MG/DL (ref 0.2–1)
BILIRUB SERPL-MCNC: 1 MG/DL (ref 0.2–1)
BILIRUB SERPL-MCNC: 1.2 MG/DL (ref 0.2–1)
BUN SERPL-MCNC: 112 MG/DL (ref 6–20)
BUN SERPL-MCNC: 87 MG/DL (ref 6–20)
BUN SERPL-MCNC: 91 MG/DL (ref 6–20)
BUN/CREAT SERPL: 38 (ref 12–20)
BUN/CREAT SERPL: 39 (ref 12–20)
BUN/CREAT SERPL: 40 (ref 12–20)
CALCIUM SERPL-MCNC: 7.7 MG/DL (ref 8.5–10.1)
CALCIUM SERPL-MCNC: 7.8 MG/DL (ref 8.5–10.1)
CALCIUM SERPL-MCNC: 8.1 MG/DL (ref 8.5–10.1)
CHLORIDE SERPL-SCNC: 104 MMOL/L (ref 97–108)
CHLORIDE SERPL-SCNC: 108 MMOL/L (ref 97–108)
CHLORIDE SERPL-SCNC: 110 MMOL/L (ref 97–108)
CO2 SERPL-SCNC: 29 MMOL/L (ref 21–32)
CO2 SERPL-SCNC: 30 MMOL/L (ref 21–32)
CO2 SERPL-SCNC: 32 MMOL/L (ref 21–32)
CREAT SERPL-MCNC: 2.16 MG/DL (ref 0.7–1.3)
CREAT SERPL-MCNC: 2.31 MG/DL (ref 0.7–1.3)
CREAT SERPL-MCNC: 2.91 MG/DL (ref 0.7–1.3)
DIFFERENTIAL METHOD BLD: ABNORMAL
DIFFERENTIAL METHOD BLD: ABNORMAL
EOSINOPHIL # BLD: 0 K/UL (ref 0–0.4)
EOSINOPHIL # BLD: 0 K/UL (ref 0–0.4)
EOSINOPHIL NFR BLD: 0 % (ref 0–7)
EOSINOPHIL NFR BLD: 0 % (ref 0–7)
ERYTHROCYTE [DISTWIDTH] IN BLOOD BY AUTOMATED COUNT: 12.4 % (ref 11.5–14.5)
ERYTHROCYTE [DISTWIDTH] IN BLOOD BY AUTOMATED COUNT: 14.5 % (ref 11.5–14.5)
GLOBULIN SER CALC-MCNC: 2.6 G/DL (ref 2–4)
GLOBULIN SER CALC-MCNC: 2.7 G/DL (ref 2–4)
GLOBULIN SER CALC-MCNC: 3 G/DL (ref 2–4)
GLUCOSE BLD STRIP.AUTO-MCNC: 108 MG/DL (ref 65–117)
GLUCOSE BLD STRIP.AUTO-MCNC: 110 MG/DL (ref 65–117)
GLUCOSE BLD STRIP.AUTO-MCNC: 119 MG/DL (ref 65–117)
GLUCOSE BLD STRIP.AUTO-MCNC: 128 MG/DL (ref 65–117)
GLUCOSE SERPL-MCNC: 104 MG/DL (ref 65–100)
GLUCOSE SERPL-MCNC: 89 MG/DL (ref 65–100)
GLUCOSE SERPL-MCNC: 98 MG/DL (ref 65–100)
HCT VFR BLD AUTO: 19.8 % (ref 36.6–50.3)
HCT VFR BLD AUTO: 21.7 % (ref 36.6–50.3)
HGB BLD-MCNC: 6.8 G/DL (ref 12.1–17)
HGB BLD-MCNC: 7.5 G/DL (ref 12.1–17)
HISTORY CHECK: NORMAL
IMM GRANULOCYTES # BLD AUTO: 0.1 K/UL (ref 0–0.04)
IMM GRANULOCYTES # BLD AUTO: 0.2 K/UL (ref 0–0.04)
IMM GRANULOCYTES NFR BLD AUTO: 1 % (ref 0–0.5)
IMM GRANULOCYTES NFR BLD AUTO: 2 % (ref 0–0.5)
LYMPHOCYTES # BLD: 1.7 K/UL (ref 0.8–3.5)
LYMPHOCYTES # BLD: 2.3 K/UL (ref 0.8–3.5)
LYMPHOCYTES NFR BLD: 15 % (ref 12–49)
LYMPHOCYTES NFR BLD: 18 % (ref 12–49)
MAGNESIUM SERPL-MCNC: 2.2 MG/DL (ref 1.6–2.4)
MCH RBC QN AUTO: 31 PG (ref 26–34)
MCH RBC QN AUTO: 31.5 PG (ref 26–34)
MCHC RBC AUTO-ENTMCNC: 34.3 G/DL (ref 30–36.5)
MCHC RBC AUTO-ENTMCNC: 34.6 G/DL (ref 30–36.5)
MCV RBC AUTO: 89.7 FL (ref 80–99)
MCV RBC AUTO: 91.7 FL (ref 80–99)
MONOCYTES # BLD: 0.5 K/UL (ref 0–1)
MONOCYTES # BLD: 0.6 K/UL (ref 0–1)
MONOCYTES NFR BLD: 5 % (ref 5–13)
MONOCYTES NFR BLD: 5 % (ref 5–13)
NEUTS SEG # BLD: 8.5 K/UL (ref 1.8–8)
NEUTS SEG # BLD: 9.8 K/UL (ref 1.8–8)
NEUTS SEG NFR BLD: 76 % (ref 32–75)
NEUTS SEG NFR BLD: 78 % (ref 32–75)
NRBC # BLD: 0 K/UL (ref 0–0.01)
NRBC # BLD: 0.02 K/UL (ref 0–0.01)
NRBC BLD-RTO: 0 PER 100 WBC
NRBC BLD-RTO: 0.2 PER 100 WBC
PHOSPHATE SERPL-MCNC: 1.4 MG/DL (ref 2.6–4.7)
PLATELET # BLD AUTO: 100 K/UL (ref 150–400)
PLATELET # BLD AUTO: 85 K/UL (ref 150–400)
PMV BLD AUTO: 11.1 FL (ref 8.9–12.9)
PMV BLD AUTO: 11.4 FL (ref 8.9–12.9)
POTASSIUM SERPL-SCNC: 3.4 MMOL/L (ref 3.5–5.1)
POTASSIUM SERPL-SCNC: 3.4 MMOL/L (ref 3.5–5.1)
POTASSIUM SERPL-SCNC: 3.6 MMOL/L (ref 3.5–5.1)
PROT SERPL-MCNC: 5 G/DL (ref 6.4–8.2)
PROT SERPL-MCNC: 5 G/DL (ref 6.4–8.2)
PROT SERPL-MCNC: 5.4 G/DL (ref 6.4–8.2)
RBC # BLD AUTO: 2.16 M/UL (ref 4.1–5.7)
RBC # BLD AUTO: 2.42 M/UL (ref 4.1–5.7)
RBC MORPH BLD: ABNORMAL
SERVICE CMNT-IMP: ABNORMAL
SERVICE CMNT-IMP: ABNORMAL
SERVICE CMNT-IMP: NORMAL
SERVICE CMNT-IMP: NORMAL
SODIUM SERPL-SCNC: 141 MMOL/L (ref 136–145)
SODIUM SERPL-SCNC: 144 MMOL/L (ref 136–145)
SODIUM SERPL-SCNC: 146 MMOL/L (ref 136–145)
WBC # BLD AUTO: 11 K/UL (ref 4.1–11.1)
WBC # BLD AUTO: 12.8 K/UL (ref 4.1–11.1)

## 2024-10-18 PROCEDURE — 2500000003 HC RX 250 WO HCPCS

## 2024-10-18 PROCEDURE — 51798 US URINE CAPACITY MEASURE: CPT

## 2024-10-18 PROCEDURE — 3700000000 HC ANESTHESIA ATTENDED CARE: Performed by: INTERNAL MEDICINE

## 2024-10-18 PROCEDURE — 80053 COMPREHEN METABOLIC PANEL: CPT

## 2024-10-18 PROCEDURE — 6370000000 HC RX 637 (ALT 250 FOR IP): Performed by: INTERNAL MEDICINE

## 2024-10-18 PROCEDURE — 86850 RBC ANTIBODY SCREEN: CPT

## 2024-10-18 PROCEDURE — 2580000003 HC RX 258: Performed by: INTERNAL MEDICINE

## 2024-10-18 PROCEDURE — 51702 INSERT TEMP BLADDER CATH: CPT

## 2024-10-18 PROCEDURE — 3600007502: Performed by: INTERNAL MEDICINE

## 2024-10-18 PROCEDURE — 0D968ZZ DRAINAGE OF STOMACH, VIA NATURAL OR ARTIFICIAL OPENING ENDOSCOPIC: ICD-10-PCS | Performed by: INTERNAL MEDICINE

## 2024-10-18 PROCEDURE — 86900 BLOOD TYPING SEROLOGIC ABO: CPT

## 2024-10-18 PROCEDURE — 2580000003 HC RX 258

## 2024-10-18 PROCEDURE — 36430 TRANSFUSION BLD/BLD COMPNT: CPT

## 2024-10-18 PROCEDURE — 82962 GLUCOSE BLOOD TEST: CPT

## 2024-10-18 PROCEDURE — 36415 COLL VENOUS BLD VENIPUNCTURE: CPT

## 2024-10-18 PROCEDURE — 86901 BLOOD TYPING SEROLOGIC RH(D): CPT

## 2024-10-18 PROCEDURE — 6360000002 HC RX W HCPCS: Performed by: NURSE PRACTITIONER

## 2024-10-18 PROCEDURE — 6360000002 HC RX W HCPCS: Performed by: INTERNAL MEDICINE

## 2024-10-18 PROCEDURE — 7100000011 HC PHASE II RECOVERY - ADDTL 15 MIN: Performed by: INTERNAL MEDICINE

## 2024-10-18 PROCEDURE — 6370000000 HC RX 637 (ALT 250 FOR IP): Performed by: NURSE PRACTITIONER

## 2024-10-18 PROCEDURE — P9016 RBC LEUKOCYTES REDUCED: HCPCS

## 2024-10-18 PROCEDURE — 2500000003 HC RX 250 WO HCPCS: Performed by: INTERNAL MEDICINE

## 2024-10-18 PROCEDURE — 85025 COMPLETE CBC W/AUTO DIFF WBC: CPT

## 2024-10-18 PROCEDURE — 2709999900 HC NON-CHARGEABLE SUPPLY: Performed by: INTERNAL MEDICINE

## 2024-10-18 PROCEDURE — 6360000002 HC RX W HCPCS

## 2024-10-18 PROCEDURE — 7100000010 HC PHASE II RECOVERY - FIRST 15 MIN: Performed by: INTERNAL MEDICINE

## 2024-10-18 PROCEDURE — 3700000001 HC ADD 15 MINUTES (ANESTHESIA): Performed by: INTERNAL MEDICINE

## 2024-10-18 PROCEDURE — 86923 COMPATIBILITY TEST ELECTRIC: CPT

## 2024-10-18 PROCEDURE — 2000000000 HC ICU R&B

## 2024-10-18 PROCEDURE — 2580000003 HC RX 258: Performed by: NURSE PRACTITIONER

## 2024-10-18 PROCEDURE — 3600007512: Performed by: INTERNAL MEDICINE

## 2024-10-18 PROCEDURE — 83735 ASSAY OF MAGNESIUM: CPT

## 2024-10-18 PROCEDURE — 6370000000 HC RX 637 (ALT 250 FOR IP)

## 2024-10-18 PROCEDURE — 84100 ASSAY OF PHOSPHORUS: CPT

## 2024-10-18 RX ORDER — SODIUM CHLORIDE 9 MG/ML
INJECTION, SOLUTION INTRAVENOUS PRN
Status: DISCONTINUED | OUTPATIENT
Start: 2024-10-18 | End: 2024-11-05 | Stop reason: HOSPADM

## 2024-10-18 RX ORDER — SODIUM CHLORIDE, SODIUM LACTATE, POTASSIUM CHLORIDE, AND CALCIUM CHLORIDE .6; .31; .03; .02 G/100ML; G/100ML; G/100ML; G/100ML
1000 INJECTION, SOLUTION INTRAVENOUS ONCE
Status: COMPLETED | OUTPATIENT
Start: 2024-10-18 | End: 2024-10-18

## 2024-10-18 RX ORDER — SODIUM CHLORIDE, SODIUM LACTATE, POTASSIUM CHLORIDE, CALCIUM CHLORIDE 600; 310; 30; 20 MG/100ML; MG/100ML; MG/100ML; MG/100ML
INJECTION, SOLUTION INTRAVENOUS
Status: DISCONTINUED | OUTPATIENT
Start: 2024-10-18 | End: 2024-10-18 | Stop reason: SDUPTHER

## 2024-10-18 RX ORDER — LIDOCAINE HYDROCHLORIDE 20 MG/ML
INJECTION, SOLUTION EPIDURAL; INFILTRATION; INTRACAUDAL; PERINEURAL
Status: DISCONTINUED | OUTPATIENT
Start: 2024-10-18 | End: 2024-10-18 | Stop reason: SDUPTHER

## 2024-10-18 RX ORDER — POTASSIUM CHLORIDE 7.45 MG/ML
10 INJECTION INTRAVENOUS
Status: DISCONTINUED | OUTPATIENT
Start: 2024-10-18 | End: 2024-10-18

## 2024-10-18 RX ADMIN — SODIUM CHLORIDE, PRESERVATIVE FREE 40 MG: 5 INJECTION INTRAVENOUS at 12:22

## 2024-10-18 RX ADMIN — FOLIC ACID 1 MG: 1 TABLET ORAL at 09:05

## 2024-10-18 RX ADMIN — Medication 1 AMPULE: at 20:28

## 2024-10-18 RX ADMIN — PHENOBARBITAL SODIUM 16.2 MG: 65 INJECTION, SOLUTION INTRAMUSCULAR; INTRAVENOUS at 20:26

## 2024-10-18 RX ADMIN — SODIUM CHLORIDE, POTASSIUM CHLORIDE, SODIUM LACTATE AND CALCIUM CHLORIDE: 600; 310; 30; 20 INJECTION, SOLUTION INTRAVENOUS at 13:43

## 2024-10-18 RX ADMIN — BENZOCAINE 1 EACH: 200 SPRAY DENTAL; ORAL; PERIODONTAL at 13:53

## 2024-10-18 RX ADMIN — SODIUM CHLORIDE, POTASSIUM CHLORIDE, SODIUM LACTATE AND CALCIUM CHLORIDE: 600; 310; 30; 20 INJECTION, SOLUTION INTRAVENOUS at 09:04

## 2024-10-18 RX ADMIN — PROPOFOL 20 MG: 10 INJECTION, EMULSION INTRAVENOUS at 14:00

## 2024-10-18 RX ADMIN — SODIUM CHLORIDE, POTASSIUM CHLORIDE, SODIUM LACTATE AND CALCIUM CHLORIDE: 600; 310; 30; 20 INJECTION, SOLUTION INTRAVENOUS at 12:54

## 2024-10-18 RX ADMIN — PROPOFOL 20 MG: 10 INJECTION, EMULSION INTRAVENOUS at 14:02

## 2024-10-18 RX ADMIN — THIAMINE HYDROCHLORIDE 250 MG: 100 INJECTION, SOLUTION INTRAMUSCULAR; INTRAVENOUS at 20:27

## 2024-10-18 RX ADMIN — PROPOFOL 50 MG: 10 INJECTION, EMULSION INTRAVENOUS at 13:53

## 2024-10-18 RX ADMIN — POTASSIUM CHLORIDE 10 MEQ: 7.46 INJECTION, SOLUTION INTRAVENOUS at 01:55

## 2024-10-18 RX ADMIN — SODIUM CHLORIDE, PRESERVATIVE FREE 40 MG: 5 INJECTION INTRAVENOUS at 20:27

## 2024-10-18 RX ADMIN — SODIUM CHLORIDE, POTASSIUM CHLORIDE, SODIUM LACTATE AND CALCIUM CHLORIDE 1000 ML: 600; 310; 30; 20 INJECTION, SOLUTION INTRAVENOUS at 11:07

## 2024-10-18 RX ADMIN — THIAMINE HYDROCHLORIDE 500 MG: 100 INJECTION, SOLUTION INTRAMUSCULAR; INTRAVENOUS at 12:23

## 2024-10-18 RX ADMIN — PROPOFOL 30 MG: 10 INJECTION, EMULSION INTRAVENOUS at 13:56

## 2024-10-18 RX ADMIN — LIDOCAINE HYDROCHLORIDE 100 MG: 20 INJECTION, SOLUTION EPIDURAL; INFILTRATION; INTRACAUDAL; PERINEURAL at 13:53

## 2024-10-18 RX ADMIN — Medication 1 TABLET: at 09:05

## 2024-10-18 RX ADMIN — PROPOFOL 20 MG: 10 INJECTION, EMULSION INTRAVENOUS at 13:55

## 2024-10-18 RX ADMIN — SODIUM CHLORIDE, PRESERVATIVE FREE 20 ML: 5 INJECTION INTRAVENOUS at 20:27

## 2024-10-18 RX ADMIN — PHENOBARBITAL SODIUM 32.5 MG: 65 INJECTION, SOLUTION INTRAMUSCULAR; INTRAVENOUS at 01:55

## 2024-10-18 RX ADMIN — POTASSIUM CHLORIDE 10 MEQ: 7.46 INJECTION, SOLUTION INTRAVENOUS at 02:43

## 2024-10-18 RX ADMIN — PROPOFOL 20 MG: 10 INJECTION, EMULSION INTRAVENOUS at 13:57

## 2024-10-18 RX ADMIN — POTASSIUM CHLORIDE 10 MEQ: 7.46 INJECTION, SOLUTION INTRAVENOUS at 00:58

## 2024-10-18 RX ADMIN — POTASSIUM PHOSPHATE, MONOBASIC AND POTASSIUM PHOSPHATE, DIBASIC 30 MMOL: 224; 236 INJECTION, SOLUTION, CONCENTRATE INTRAVENOUS at 09:55

## 2024-10-18 RX ADMIN — SODIUM CHLORIDE, PRESERVATIVE FREE 10 ML: 5 INJECTION INTRAVENOUS at 20:28

## 2024-10-18 RX ADMIN — PROPOFOL 20 MG: 10 INJECTION, EMULSION INTRAVENOUS at 13:59

## 2024-10-18 RX ADMIN — SODIUM CHLORIDE, PRESERVATIVE FREE 10 ML: 5 INJECTION INTRAVENOUS at 09:06

## 2024-10-18 RX ADMIN — THIAMINE HYDROCHLORIDE 500 MG: 100 INJECTION, SOLUTION INTRAMUSCULAR; INTRAVENOUS at 06:15

## 2024-10-18 RX ADMIN — Medication 1 AMPULE: at 09:05

## 2024-10-18 ASSESSMENT — PAIN SCALES - GENERAL: PAINLEVEL_OUTOF10: 0

## 2024-10-18 ASSESSMENT — LIFESTYLE VARIABLES: SMOKING_STATUS: 1

## 2024-10-18 NOTE — ANESTHESIA PRE PROCEDURE
Department of Anesthesiology  Preprocedure Note       Name:  Getachew Durham   Age:  64 y.o.  :  1960                                          MRN:  [id number]         Date:  10/18/2024      Surgeon: Surgeon(s):  Sukumar Ramos MD    Procedure: Procedure(s):  ESOPHAGOGASTRODUODENOSCOPY    Medications prior to admission:   Prior to Admission medications    Not on File       Current medications:    Current Facility-Administered Medications   Medication Dose Route Frequency Provider Last Rate Last Admin   • 0.9 % sodium chloride infusion   IntraVENous PRN Cherelle Brink APRN - NP       • potassium phosphate 30 mmol in sodium chloride 0.9 % 500 mL IVPB  30 mmol IntraVENous Once Renato Agarwal MD 83.3 mL/hr at 10/18/24 0955 30 mmol at 10/18/24 0955   • pantoprazole (PROTONIX) 40 mg in sodium chloride (PF) 0.9 % 10 mL injection  40 mg IntraVENous Q12H Renato Agarwal MD       • lactated ringers bolus 1,000 mL  1,000 mL IntraVENous Once Renato Agarwal .9 mL/hr at 10/18/24 1107 1,000 mL at 10/18/24 1107   • insulin lispro (HUMALOG,ADMELOG) injection vial 0-4 Units  0-4 Units SubCUTAneous 4x Daily AC & HS Flory Workman APRN - CNP   1 Units at 10/17/24 1300   • glucose chewable tablet 16 g  4 tablet Oral PRN Flory Workman APRN - CNP       • dextrose bolus 10% 125 mL  125 mL IntraVENous PRN Flory Workman APRN - CNP        Or   • dextrose bolus 10% 250 mL  250 mL IntraVENous PRN Flory Workman APRN - CNP       • glucagon injection 1 mg  1 mg SubCUTAneous PRN Flory Workman APRN - CNP       • dextrose 10 % infusion   IntraVENous Continuous PRN Flory Workman APRN - CNP       • alcohol 62% (NOZIN) nasal  1 ampule  1 ampule Nasal BID Renato Agarwal MD   1 ampule at 10/18/24 0905   • lactated ringers IV soln infusion   IntraVENous Continuous Renato Agarwal MD 50 mL/hr at 10/18/24 1023 Rate Change at 10/18/24 1023   • sodium chloride flush 0.9 % injection 5-40 mL

## 2024-10-18 NOTE — PERIOP NOTE
Patient arrived in pre op with floor RN. Report received from Jennifer HURTADO. Consent obtained from wife Kelly due to AMS.     The risks and benefits of the bite block have been explained to patient.  Patient verbalizes understanding.     Scope pre cleaned by Sebastián MARC      TRANSFER - OUT REPORT:    Verbal report given to Jeannine RN on Getachew Durham  being transferred to [facility] for routine progression of patient care       Report consisted of patient's Situation, Background, Assessment and   Recommendations(SBAR).     Information from the following report(s) Nurse Handoff Report was reviewed with the receiving nurse.           Lines:   Peripheral IV 10/16/24 Left Cephalic (Active)   Site Assessment Clean, dry & intact 10/18/24 1200   Line Status Infusing 10/18/24 1200   Line Care Connections checked and tightened 10/18/24 1200   Phlebitis Assessment No symptoms 10/18/24 1200   Infiltration Assessment 0 10/18/24 1200   Alcohol Cap Used Yes 10/18/24 1200   Dressing Status Clean, dry & intact 10/18/24 1200   Dressing Type Transparent 10/18/24 1200       Peripheral IV 10/17/24 Right Forearm (Active)   Site Assessment Clean, dry & intact 10/18/24 1200   Line Status Infusing;Blood return noted;Flushed 10/18/24 1200   Line Care Connections checked and tightened 10/18/24 1200   Phlebitis Assessment Erythema at access site with or without pain 10/18/24 1200   Infiltration Assessment 0 10/18/24 1200   Alcohol Cap Used Yes 10/18/24 1200   Dressing Status Clean, dry & intact 10/18/24 1200   Dressing Type Transparent 10/18/24 1200   Dressing Intervention New 10/17/24 1600        Opportunity for questions and clarification was provided.      Patient transported with:  Patient chart, transport monitor

## 2024-10-18 NOTE — OP NOTE
NAME:  Getachew Durham   :   1960   MRN:   [id number]     Date/Time:  10/18/2024 2:05 PM    Esophagogastroduodenoscopy (EGD) Procedure Note    :  Sukumar Ramos MD    Staff: Circulator: Melissa Diaz RN  Endoscopy Technician: Sebastián Cronin    Referring Provider:  No primary care provider on file.    Anethesia/Sedation:  MAC anesthesia Propofol    Procedure Details   After infomed consent was obtained for the procedure, with all risks and benefits of procedure explained the patient was taken to the endoscopy suite and placed in the left lateral decubitus position.  Following sequential administration of sedation as per above, the gastroscope was inserted into the mouth and advanced under direct vision to second portion of the duodenum.  A careful inspection was made as the gastroscope was withdrawn, including a retroflexed view of the proximal stomach; findings and interventions are described below.      Findings:  Esophagus:Diffuse severely scarred and ulcerated erosive Grade-D esophagitis with mild contact friability but no intervenable lesion from 28cm until EGJ at 40cm with a 3cm healthy hiatal hernia hill-3 GEFV. No esophageal varices.  Stomach: Old blood coating stomach. Lavaged with excellent visualization. Mildly congested mucosa but otherwise normal without erosions or inflammation or ulcers. Pylorus patent. No gastric varices on retroflexion  Duodenum/jejunum: mild duodenal bulb erythema and few nonbleeding erosions, otherwise normal D2 with yellow bile.           Complications: None.     EBL:  none    Impression:    See findings above    Recommendations:   - Resume normal medications.  - IV PPI 40mg BID  - Diet per primary team.    Discharge disposition:  ICU    Sukumar Ramos MD

## 2024-10-18 NOTE — ANESTHESIA POSTPROCEDURE EVALUATION
Department of Anesthesiology  Postprocedure Note    Patient: Getachew Durham  MRN: [id number]  YOB: 1960  Date of evaluation: 10/18/2024    Procedure Summary       Date: 10/18/24 Room / Location: [facility] ENDO 02 / [facility] ENDOSCOPY    Anesthesia Start: 1343 Anesthesia Stop: 1416    Procedure: ESOPHAGOGASTRODUODENOSCOPY Diagnosis:       Melena      Erosive esophagitis      Duodenitis      Hiatal hernia      (Melena [K92.1])    Surgeons: Sukumar Ramos MD Responsible Provider: MARIO Moody MD    Anesthesia Type: MAC ASA Status: 3            Anesthesia Type: MAC    Monalisa Phase I: Monalisa Score: 10    Monalisa Phase II: Monalisa Score: 9    Anesthesia Post Evaluation    Patient location during evaluation: bedside  Patient participation: complete - patient participated  Level of consciousness: responsive to verbal stimuli and awake and alert  Pain score: 2  Nausea & Vomiting: no nausea  Cardiovascular status: blood pressure returned to baseline  Respiratory status: acceptable  Hydration status: euvolemic  Multimodal analgesia pain management approach  Pain management: adequate    No notable events documented.

## 2024-10-19 LAB
ABO + RH BLD: NORMAL
ALBUMIN SERPL-MCNC: 2.5 G/DL (ref 3.5–5)
ALBUMIN SERPL-MCNC: 2.7 G/DL (ref 3.5–5)
ALBUMIN/GLOB SERPL: 0.9 (ref 1.1–2.2)
ALBUMIN/GLOB SERPL: 1 (ref 1.1–2.2)
ALP SERPL-CCNC: 53 U/L (ref 45–117)
ALP SERPL-CCNC: 56 U/L (ref 45–117)
ALT SERPL-CCNC: 29 U/L (ref 12–78)
ALT SERPL-CCNC: 31 U/L (ref 12–78)
ANION GAP SERPL CALC-SCNC: 3 MMOL/L (ref 2–12)
ANION GAP SERPL CALC-SCNC: 5 MMOL/L (ref 2–12)
AST SERPL-CCNC: 47 U/L (ref 15–37)
AST SERPL-CCNC: 51 U/L (ref 15–37)
BASOPHILS # BLD: 0 K/UL (ref 0–0.1)
BASOPHILS NFR BLD: 0 % (ref 0–1)
BILIRUB SERPL-MCNC: 1 MG/DL (ref 0.2–1)
BILIRUB SERPL-MCNC: 1 MG/DL (ref 0.2–1)
BLD PROD TYP BPU: NORMAL
BLOOD BANK BLOOD PRODUCT EXPIRATION DATE: NORMAL
BLOOD BANK DISPENSE STATUS: NORMAL
BLOOD BANK ISBT PRODUCT BLOOD TYPE: 5100
BLOOD BANK PRODUCT CODE: NORMAL
BLOOD BANK UNIT TYPE AND RH: NORMAL
BLOOD GROUP ANTIBODIES SERPL: NORMAL
BPU ID: NORMAL
BUN SERPL-MCNC: 68 MG/DL (ref 6–20)
BUN SERPL-MCNC: 76 MG/DL (ref 6–20)
BUN/CREAT SERPL: 35 (ref 12–20)
BUN/CREAT SERPL: 37 (ref 12–20)
CALCIUM SERPL-MCNC: 8 MG/DL (ref 8.5–10.1)
CALCIUM SERPL-MCNC: 8.4 MG/DL (ref 8.5–10.1)
CHLORIDE SERPL-SCNC: 110 MMOL/L (ref 97–108)
CHLORIDE SERPL-SCNC: 111 MMOL/L (ref 97–108)
CO2 SERPL-SCNC: 32 MMOL/L (ref 21–32)
CO2 SERPL-SCNC: 33 MMOL/L (ref 21–32)
CREAT SERPL-MCNC: 1.92 MG/DL (ref 0.7–1.3)
CREAT SERPL-MCNC: 2.08 MG/DL (ref 0.7–1.3)
CROSSMATCH RESULT: NORMAL
DIFFERENTIAL METHOD BLD: ABNORMAL
EKG ATRIAL RATE: 72 BPM
EKG DIAGNOSIS: NORMAL
EKG P AXIS: 73 DEGREES
EKG P-R INTERVAL: 152 MS
EKG Q-T INTERVAL: 470 MS
EKG QRS DURATION: 84 MS
EKG QTC CALCULATION (BAZETT): 514 MS
EKG R AXIS: -49 DEGREES
EKG T AXIS: 69 DEGREES
EKG VENTRICULAR RATE: 72 BPM
EOSINOPHIL # BLD: 0 K/UL (ref 0–0.4)
EOSINOPHIL NFR BLD: 0 % (ref 0–7)
ERYTHROCYTE [DISTWIDTH] IN BLOOD BY AUTOMATED COUNT: 15 % (ref 11.5–14.5)
GLOBULIN SER CALC-MCNC: 2.5 G/DL (ref 2–4)
GLOBULIN SER CALC-MCNC: 2.9 G/DL (ref 2–4)
GLUCOSE BLD STRIP.AUTO-MCNC: 101 MG/DL (ref 65–117)
GLUCOSE BLD STRIP.AUTO-MCNC: 122 MG/DL (ref 65–117)
GLUCOSE BLD STRIP.AUTO-MCNC: 123 MG/DL (ref 65–117)
GLUCOSE BLD STRIP.AUTO-MCNC: 138 MG/DL (ref 65–117)
GLUCOSE SERPL-MCNC: 100 MG/DL (ref 65–100)
GLUCOSE SERPL-MCNC: 108 MG/DL (ref 65–100)
HCT VFR BLD AUTO: 22.7 % (ref 36.6–50.3)
HGB BLD-MCNC: 7.9 G/DL (ref 12.1–17)
IMM GRANULOCYTES # BLD AUTO: 0.2 K/UL (ref 0–0.04)
IMM GRANULOCYTES NFR BLD AUTO: 2 % (ref 0–0.5)
LYMPHOCYTES # BLD: 1.8 K/UL (ref 0.8–3.5)
LYMPHOCYTES NFR BLD: 16 % (ref 12–49)
MAGNESIUM SERPL-MCNC: 1.9 MG/DL (ref 1.6–2.4)
MCH RBC QN AUTO: 31 PG (ref 26–34)
MCHC RBC AUTO-ENTMCNC: 34.8 G/DL (ref 30–36.5)
MCV RBC AUTO: 89 FL (ref 80–99)
MONOCYTES # BLD: 0.6 K/UL (ref 0–1)
MONOCYTES NFR BLD: 5 % (ref 5–13)
NEUTS SEG # BLD: 8.8 K/UL (ref 1.8–8)
NEUTS SEG NFR BLD: 77 % (ref 32–75)
NRBC # BLD: 0 K/UL (ref 0–0.01)
NRBC BLD-RTO: 0 PER 100 WBC
PHOSPHATE SERPL-MCNC: 2.6 MG/DL (ref 2.6–4.7)
PLATELET # BLD AUTO: 84 K/UL (ref 150–400)
PMV BLD AUTO: 10.7 FL (ref 8.9–12.9)
POTASSIUM SERPL-SCNC: 3.9 MMOL/L (ref 3.5–5.1)
POTASSIUM SERPL-SCNC: 4.2 MMOL/L (ref 3.5–5.1)
PROT SERPL-MCNC: 5 G/DL (ref 6.4–8.2)
PROT SERPL-MCNC: 5.6 G/DL (ref 6.4–8.2)
RBC # BLD AUTO: 2.55 M/UL (ref 4.1–5.7)
RBC MORPH BLD: ABNORMAL
RBC MORPH BLD: ABNORMAL
SERVICE CMNT-IMP: ABNORMAL
SERVICE CMNT-IMP: NORMAL
SODIUM SERPL-SCNC: 147 MMOL/L (ref 136–145)
SODIUM SERPL-SCNC: 147 MMOL/L (ref 136–145)
SPECIMEN EXP DATE BLD: NORMAL
UNIT DIVISION: 0
UNIT ISSUE DATE/TIME: NORMAL
WBC # BLD AUTO: 11.4 K/UL (ref 4.1–11.1)

## 2024-10-19 PROCEDURE — 2580000003 HC RX 258: Performed by: NURSE PRACTITIONER

## 2024-10-19 PROCEDURE — 6360000002 HC RX W HCPCS: Performed by: NURSE PRACTITIONER

## 2024-10-19 PROCEDURE — 6360000002 HC RX W HCPCS: Performed by: INTERNAL MEDICINE

## 2024-10-19 PROCEDURE — 2580000003 HC RX 258: Performed by: INTERNAL MEDICINE

## 2024-10-19 PROCEDURE — 84100 ASSAY OF PHOSPHORUS: CPT

## 2024-10-19 PROCEDURE — 85025 COMPLETE CBC W/AUTO DIFF WBC: CPT

## 2024-10-19 PROCEDURE — 36415 COLL VENOUS BLD VENIPUNCTURE: CPT

## 2024-10-19 PROCEDURE — 6370000000 HC RX 637 (ALT 250 FOR IP): Performed by: NURSE PRACTITIONER

## 2024-10-19 PROCEDURE — 82962 GLUCOSE BLOOD TEST: CPT

## 2024-10-19 PROCEDURE — 80053 COMPREHEN METABOLIC PANEL: CPT

## 2024-10-19 PROCEDURE — 83735 ASSAY OF MAGNESIUM: CPT

## 2024-10-19 PROCEDURE — 2060000000 HC ICU INTERMEDIATE R&B

## 2024-10-19 PROCEDURE — 6370000000 HC RX 637 (ALT 250 FOR IP): Performed by: INTERNAL MEDICINE

## 2024-10-19 RX ORDER — CASTOR OIL AND BALSAM, PERU 788; 87 MG/G; MG/G
OINTMENT TOPICAL 2 TIMES DAILY
Status: DISCONTINUED | OUTPATIENT
Start: 2024-10-19 | End: 2024-11-05 | Stop reason: HOSPADM

## 2024-10-19 RX ADMIN — Medication 1 TABLET: at 09:51

## 2024-10-19 RX ADMIN — FOLIC ACID 1 MG: 1 TABLET ORAL at 09:51

## 2024-10-19 RX ADMIN — SODIUM CHLORIDE, PRESERVATIVE FREE 10 ML: 5 INJECTION INTRAVENOUS at 21:41

## 2024-10-19 RX ADMIN — Medication 1 AMPULE: at 09:00

## 2024-10-19 RX ADMIN — SODIUM CHLORIDE, PRESERVATIVE FREE 40 MG: 5 INJECTION INTRAVENOUS at 09:51

## 2024-10-19 RX ADMIN — SODIUM CHLORIDE, PRESERVATIVE FREE 10 ML: 5 INJECTION INTRAVENOUS at 09:52

## 2024-10-19 RX ADMIN — Medication: at 10:28

## 2024-10-19 RX ADMIN — Medication: at 21:40

## 2024-10-19 RX ADMIN — POTASSIUM BICARBONATE 40 MEQ: 782 TABLET, EFFERVESCENT ORAL at 00:27

## 2024-10-19 RX ADMIN — SODIUM CHLORIDE, POTASSIUM CHLORIDE, SODIUM LACTATE AND CALCIUM CHLORIDE: 600; 310; 30; 20 INJECTION, SOLUTION INTRAVENOUS at 10:41

## 2024-10-19 RX ADMIN — THIAMINE HYDROCHLORIDE 250 MG: 100 INJECTION, SOLUTION INTRAMUSCULAR; INTRAVENOUS at 21:40

## 2024-10-19 RX ADMIN — SODIUM CHLORIDE, PRESERVATIVE FREE 40 MG: 5 INJECTION INTRAVENOUS at 21:40

## 2024-10-19 RX ADMIN — PHENOBARBITAL SODIUM 16.2 MG: 65 INJECTION, SOLUTION INTRAMUSCULAR; INTRAVENOUS at 10:38

## 2024-10-19 ASSESSMENT — PAIN SCALES - GENERAL
PAINLEVEL_OUTOF10: 0
PAINLEVEL_OUTOF10: 0

## 2024-10-20 LAB
ALBUMIN SERPL-MCNC: 2.5 G/DL (ref 3.5–5)
ALBUMIN/GLOB SERPL: 1 (ref 1.1–2.2)
ALP SERPL-CCNC: 56 U/L (ref 45–117)
ALT SERPL-CCNC: 32 U/L (ref 12–78)
ANION GAP SERPL CALC-SCNC: 6 MMOL/L (ref 2–12)
AST SERPL-CCNC: 40 U/L (ref 15–37)
BILIRUB SERPL-MCNC: 0.8 MG/DL (ref 0.2–1)
BUN SERPL-MCNC: 45 MG/DL (ref 6–20)
BUN/CREAT SERPL: 27 (ref 12–20)
CALCIUM SERPL-MCNC: 7.8 MG/DL (ref 8.5–10.1)
CHLORIDE SERPL-SCNC: 112 MMOL/L (ref 97–108)
CO2 SERPL-SCNC: 31 MMOL/L (ref 21–32)
CREAT SERPL-MCNC: 1.65 MG/DL (ref 0.7–1.3)
ERYTHROCYTE [DISTWIDTH] IN BLOOD BY AUTOMATED COUNT: 14.9 % (ref 11.5–14.5)
GLOBULIN SER CALC-MCNC: 2.4 G/DL (ref 2–4)
GLUCOSE BLD STRIP.AUTO-MCNC: 115 MG/DL (ref 65–117)
GLUCOSE BLD STRIP.AUTO-MCNC: 116 MG/DL (ref 65–117)
GLUCOSE BLD STRIP.AUTO-MCNC: 117 MG/DL (ref 65–117)
GLUCOSE BLD STRIP.AUTO-MCNC: 139 MG/DL (ref 65–117)
GLUCOSE SERPL-MCNC: 97 MG/DL (ref 65–100)
HCT VFR BLD AUTO: 21 % (ref 36.6–50.3)
HGB BLD-MCNC: 7.1 G/DL (ref 12.1–17)
MAGNESIUM SERPL-MCNC: 1.5 MG/DL (ref 1.6–2.4)
MCH RBC QN AUTO: 30.3 PG (ref 26–34)
MCHC RBC AUTO-ENTMCNC: 33.8 G/DL (ref 30–36.5)
MCV RBC AUTO: 89.7 FL (ref 80–99)
NRBC # BLD: 0.02 K/UL (ref 0–0.01)
NRBC BLD-RTO: 0.2 PER 100 WBC
PHOSPHATE SERPL-MCNC: 2 MG/DL (ref 2.6–4.7)
PLATELET # BLD AUTO: 85 K/UL (ref 150–400)
PMV BLD AUTO: 10.9 FL (ref 8.9–12.9)
POTASSIUM SERPL-SCNC: 3.5 MMOL/L (ref 3.5–5.1)
PROT SERPL-MCNC: 4.9 G/DL (ref 6.4–8.2)
RBC # BLD AUTO: 2.34 M/UL (ref 4.1–5.7)
SERVICE CMNT-IMP: ABNORMAL
SERVICE CMNT-IMP: NORMAL
SODIUM SERPL-SCNC: 149 MMOL/L (ref 136–145)
WBC # BLD AUTO: 9.9 K/UL (ref 4.1–11.1)

## 2024-10-20 PROCEDURE — 80053 COMPREHEN METABOLIC PANEL: CPT

## 2024-10-20 PROCEDURE — 2580000003 HC RX 258: Performed by: NURSE PRACTITIONER

## 2024-10-20 PROCEDURE — 6360000002 HC RX W HCPCS: Performed by: INTERNAL MEDICINE

## 2024-10-20 PROCEDURE — 36415 COLL VENOUS BLD VENIPUNCTURE: CPT

## 2024-10-20 PROCEDURE — 6370000000 HC RX 637 (ALT 250 FOR IP): Performed by: INTERNAL MEDICINE

## 2024-10-20 PROCEDURE — 6370000000 HC RX 637 (ALT 250 FOR IP): Performed by: NURSE PRACTITIONER

## 2024-10-20 PROCEDURE — 84100 ASSAY OF PHOSPHORUS: CPT

## 2024-10-20 PROCEDURE — 6370000000 HC RX 637 (ALT 250 FOR IP): Performed by: STUDENT IN AN ORGANIZED HEALTH CARE EDUCATION/TRAINING PROGRAM

## 2024-10-20 PROCEDURE — 6360000002 HC RX W HCPCS: Performed by: NURSE PRACTITIONER

## 2024-10-20 PROCEDURE — 83735 ASSAY OF MAGNESIUM: CPT

## 2024-10-20 PROCEDURE — 2580000003 HC RX 258: Performed by: INTERNAL MEDICINE

## 2024-10-20 PROCEDURE — 82962 GLUCOSE BLOOD TEST: CPT

## 2024-10-20 PROCEDURE — 2060000000 HC ICU INTERMEDIATE R&B

## 2024-10-20 PROCEDURE — 85027 COMPLETE CBC AUTOMATED: CPT

## 2024-10-20 RX ORDER — PANTOPRAZOLE SODIUM 40 MG/1
40 TABLET, DELAYED RELEASE ORAL
Status: DISCONTINUED | OUTPATIENT
Start: 2024-10-20 | End: 2024-11-05 | Stop reason: HOSPADM

## 2024-10-20 RX ADMIN — Medication 1 TABLET: at 08:44

## 2024-10-20 RX ADMIN — DIBASIC SODIUM PHOSPHATE, MONOBASIC POTASSIUM PHOSPHATE AND MONOBASIC SODIUM PHOSPHATE 1 TABLET: 852; 155; 130 TABLET ORAL at 22:00

## 2024-10-20 RX ADMIN — Medication: at 08:44

## 2024-10-20 RX ADMIN — SODIUM CHLORIDE, POTASSIUM CHLORIDE, SODIUM LACTATE AND CALCIUM CHLORIDE: 600; 310; 30; 20 INJECTION, SOLUTION INTRAVENOUS at 08:03

## 2024-10-20 RX ADMIN — Medication: at 21:59

## 2024-10-20 RX ADMIN — SODIUM CHLORIDE, PRESERVATIVE FREE 10 ML: 5 INJECTION INTRAVENOUS at 22:00

## 2024-10-20 RX ADMIN — SODIUM CHLORIDE, PRESERVATIVE FREE 10 ML: 5 INJECTION INTRAVENOUS at 08:44

## 2024-10-20 RX ADMIN — FOLIC ACID 1 MG: 1 TABLET ORAL at 08:44

## 2024-10-20 RX ADMIN — THIAMINE HYDROCHLORIDE 250 MG: 100 INJECTION, SOLUTION INTRAMUSCULAR; INTRAVENOUS at 22:01

## 2024-10-20 RX ADMIN — SODIUM CHLORIDE, PRESERVATIVE FREE 40 MG: 5 INJECTION INTRAVENOUS at 08:44

## 2024-10-20 RX ADMIN — Medication 1 AMPULE: at 08:43

## 2024-10-20 RX ADMIN — Medication 1 AMPULE: at 22:00

## 2024-10-20 RX ADMIN — DIBASIC SODIUM PHOSPHATE, MONOBASIC POTASSIUM PHOSPHATE AND MONOBASIC SODIUM PHOSPHATE 1 TABLET: 852; 155; 130 TABLET ORAL at 09:39

## 2024-10-20 RX ADMIN — PANTOPRAZOLE SODIUM 40 MG: 40 TABLET, DELAYED RELEASE ORAL at 17:09

## 2024-10-21 LAB
ALBUMIN SERPL-MCNC: 2.2 G/DL (ref 3.5–5)
ALBUMIN/GLOB SERPL: 0.8 (ref 1.1–2.2)
ALP SERPL-CCNC: 57 U/L (ref 45–117)
ALT SERPL-CCNC: 30 U/L (ref 12–78)
ANION GAP SERPL CALC-SCNC: 2 MMOL/L (ref 2–12)
AST SERPL-CCNC: 33 U/L (ref 15–37)
BACTERIA SPEC CULT: NORMAL
BACTERIA SPEC CULT: NORMAL
BILIRUB SERPL-MCNC: 0.7 MG/DL (ref 0.2–1)
BUN SERPL-MCNC: 27 MG/DL (ref 6–20)
BUN/CREAT SERPL: 20 (ref 12–20)
CALCIUM SERPL-MCNC: 7.7 MG/DL (ref 8.5–10.1)
CHLORIDE SERPL-SCNC: 113 MMOL/L (ref 97–108)
CO2 SERPL-SCNC: 34 MMOL/L (ref 21–32)
CREAT SERPL-MCNC: 1.35 MG/DL (ref 0.7–1.3)
GLOBULIN SER CALC-MCNC: 2.7 G/DL (ref 2–4)
GLUCOSE BLD STRIP.AUTO-MCNC: 100 MG/DL (ref 65–117)
GLUCOSE BLD STRIP.AUTO-MCNC: 133 MG/DL (ref 65–117)
GLUCOSE BLD STRIP.AUTO-MCNC: 96 MG/DL (ref 65–117)
GLUCOSE BLD STRIP.AUTO-MCNC: 97 MG/DL (ref 65–117)
GLUCOSE SERPL-MCNC: 90 MG/DL (ref 65–100)
MAGNESIUM SERPL-MCNC: 1.2 MG/DL (ref 1.6–2.4)
POTASSIUM SERPL-SCNC: 3.2 MMOL/L (ref 3.5–5.1)
PROT SERPL-MCNC: 4.9 G/DL (ref 6.4–8.2)
SERVICE CMNT-IMP: ABNORMAL
SERVICE CMNT-IMP: NORMAL
SODIUM SERPL-SCNC: 149 MMOL/L (ref 136–145)

## 2024-10-21 PROCEDURE — 82962 GLUCOSE BLOOD TEST: CPT

## 2024-10-21 PROCEDURE — 6360000002 HC RX W HCPCS: Performed by: NURSE PRACTITIONER

## 2024-10-21 PROCEDURE — 80053 COMPREHEN METABOLIC PANEL: CPT

## 2024-10-21 PROCEDURE — 6360000002 HC RX W HCPCS: Performed by: STUDENT IN AN ORGANIZED HEALTH CARE EDUCATION/TRAINING PROGRAM

## 2024-10-21 PROCEDURE — 83735 ASSAY OF MAGNESIUM: CPT

## 2024-10-21 PROCEDURE — 6370000000 HC RX 637 (ALT 250 FOR IP): Performed by: INTERNAL MEDICINE

## 2024-10-21 PROCEDURE — 2580000003 HC RX 258: Performed by: INTERNAL MEDICINE

## 2024-10-21 PROCEDURE — 2580000003 HC RX 258: Performed by: NURSE PRACTITIONER

## 2024-10-21 PROCEDURE — 36415 COLL VENOUS BLD VENIPUNCTURE: CPT

## 2024-10-21 PROCEDURE — 2060000000 HC ICU INTERMEDIATE R&B

## 2024-10-21 PROCEDURE — 6360000002 HC RX W HCPCS: Performed by: INTERNAL MEDICINE

## 2024-10-21 RX ORDER — SODIUM CHLORIDE 0.9 % (FLUSH) 0.9 %
5-40 SYRINGE (ML) INJECTION PRN
Status: DISCONTINUED | OUTPATIENT
Start: 2024-10-21 | End: 2024-11-05 | Stop reason: HOSPADM

## 2024-10-21 RX ORDER — LORAZEPAM 1 MG/1
4 TABLET ORAL
Status: DISCONTINUED | OUTPATIENT
Start: 2024-10-21 | End: 2024-11-03

## 2024-10-21 RX ORDER — LORAZEPAM 2 MG/ML
3 INJECTION INTRAMUSCULAR
Status: DISCONTINUED | OUTPATIENT
Start: 2024-10-21 | End: 2024-11-03

## 2024-10-21 RX ORDER — LORAZEPAM 2 MG/ML
1 INJECTION INTRAMUSCULAR
Status: DISCONTINUED | OUTPATIENT
Start: 2024-10-21 | End: 2024-11-03

## 2024-10-21 RX ORDER — PHENOBARBITAL SODIUM 65 MG/ML
65 INJECTION, SOLUTION INTRAMUSCULAR; INTRAVENOUS ONCE
Status: COMPLETED | OUTPATIENT
Start: 2024-10-21 | End: 2024-10-21

## 2024-10-21 RX ORDER — POTASSIUM CHLORIDE 7.45 MG/ML
10 INJECTION INTRAVENOUS
Status: COMPLETED | OUTPATIENT
Start: 2024-10-21 | End: 2024-10-21

## 2024-10-21 RX ORDER — LORAZEPAM 1 MG/1
3 TABLET ORAL
Status: DISCONTINUED | OUTPATIENT
Start: 2024-10-21 | End: 2024-11-03

## 2024-10-21 RX ORDER — LORAZEPAM 2 MG/ML
2 INJECTION INTRAMUSCULAR
Status: DISCONTINUED | OUTPATIENT
Start: 2024-10-21 | End: 2024-11-03

## 2024-10-21 RX ORDER — LORAZEPAM 1 MG/1
1 TABLET ORAL
Status: DISCONTINUED | OUTPATIENT
Start: 2024-10-21 | End: 2024-11-03

## 2024-10-21 RX ORDER — GAUZE BANDAGE 2" X 2"
100 BANDAGE TOPICAL DAILY
Status: DISCONTINUED | OUTPATIENT
Start: 2024-10-21 | End: 2024-10-21 | Stop reason: SDUPTHER

## 2024-10-21 RX ORDER — SODIUM CHLORIDE 9 MG/ML
INJECTION, SOLUTION INTRAVENOUS PRN
Status: DISCONTINUED | OUTPATIENT
Start: 2024-10-21 | End: 2024-11-05 | Stop reason: HOSPADM

## 2024-10-21 RX ORDER — SODIUM CHLORIDE 0.9 % (FLUSH) 0.9 %
5-40 SYRINGE (ML) INJECTION EVERY 12 HOURS SCHEDULED
Status: DISCONTINUED | OUTPATIENT
Start: 2024-10-21 | End: 2024-11-05 | Stop reason: HOSPADM

## 2024-10-21 RX ORDER — LORAZEPAM 1 MG/1
2 TABLET ORAL
Status: DISCONTINUED | OUTPATIENT
Start: 2024-10-21 | End: 2024-11-03

## 2024-10-21 RX ORDER — LORAZEPAM 2 MG/ML
4 INJECTION INTRAMUSCULAR
Status: DISCONTINUED | OUTPATIENT
Start: 2024-10-21 | End: 2024-11-03

## 2024-10-21 RX ADMIN — SODIUM CHLORIDE, PRESERVATIVE FREE 10 ML: 5 INJECTION INTRAVENOUS at 10:00

## 2024-10-21 RX ADMIN — THIAMINE HYDROCHLORIDE 250 MG: 100 INJECTION, SOLUTION INTRAMUSCULAR; INTRAVENOUS at 21:25

## 2024-10-21 RX ADMIN — Medication 1 AMPULE: at 09:59

## 2024-10-21 RX ADMIN — POTASSIUM CHLORIDE 10 MEQ: 7.46 INJECTION, SOLUTION INTRAVENOUS at 14:10

## 2024-10-21 RX ADMIN — Medication 1 AMPULE: at 21:25

## 2024-10-21 RX ADMIN — LORAZEPAM 2 MG: 1 TABLET ORAL at 02:03

## 2024-10-21 RX ADMIN — LORAZEPAM 1 MG: 1 TABLET ORAL at 19:36

## 2024-10-21 RX ADMIN — Medication: at 21:24

## 2024-10-21 RX ADMIN — SODIUM CHLORIDE, PRESERVATIVE FREE 10 ML: 5 INJECTION INTRAVENOUS at 09:59

## 2024-10-21 RX ADMIN — Medication: at 10:00

## 2024-10-21 RX ADMIN — PHENOBARBITAL SODIUM 65 MG: 65 INJECTION, SOLUTION INTRAMUSCULAR; INTRAVENOUS at 00:32

## 2024-10-21 RX ADMIN — SODIUM CHLORIDE, PRESERVATIVE FREE 10 ML: 5 INJECTION INTRAVENOUS at 21:24

## 2024-10-21 RX ADMIN — POTASSIUM CHLORIDE 10 MEQ: 7.46 INJECTION, SOLUTION INTRAVENOUS at 13:06

## 2024-10-22 LAB
ALBUMIN SERPL-MCNC: 2.4 G/DL (ref 3.5–5)
ALBUMIN/GLOB SERPL: 0.8 (ref 1.1–2.2)
ALP SERPL-CCNC: 69 U/L (ref 45–117)
ALT SERPL-CCNC: 29 U/L (ref 12–78)
ANION GAP SERPL CALC-SCNC: 3 MMOL/L (ref 2–12)
AST SERPL-CCNC: 30 U/L (ref 15–37)
BASOPHILS # BLD: 0 K/UL (ref 0–0.1)
BASOPHILS NFR BLD: 0 % (ref 0–1)
BILIRUB SERPL-MCNC: 1.1 MG/DL (ref 0.2–1)
BUN SERPL-MCNC: 20 MG/DL (ref 6–20)
BUN/CREAT SERPL: 15 (ref 12–20)
CALCIUM SERPL-MCNC: 7.6 MG/DL (ref 8.5–10.1)
CHLORIDE SERPL-SCNC: 112 MMOL/L (ref 97–108)
CO2 SERPL-SCNC: 30 MMOL/L (ref 21–32)
CREAT SERPL-MCNC: 1.33 MG/DL (ref 0.7–1.3)
DIFFERENTIAL METHOD BLD: ABNORMAL
EOSINOPHIL # BLD: 0.1 K/UL (ref 0–0.4)
EOSINOPHIL NFR BLD: 1 % (ref 0–7)
ERYTHROCYTE [DISTWIDTH] IN BLOOD BY AUTOMATED COUNT: 14.6 % (ref 11.5–14.5)
GLOBULIN SER CALC-MCNC: 3.2 G/DL (ref 2–4)
GLUCOSE BLD STRIP.AUTO-MCNC: 100 MG/DL (ref 65–117)
GLUCOSE BLD STRIP.AUTO-MCNC: 111 MG/DL (ref 65–117)
GLUCOSE BLD STRIP.AUTO-MCNC: 130 MG/DL (ref 65–117)
GLUCOSE BLD STRIP.AUTO-MCNC: 89 MG/DL (ref 65–117)
GLUCOSE SERPL-MCNC: 86 MG/DL (ref 65–100)
HCT VFR BLD AUTO: 23.8 % (ref 36.6–50.3)
HGB BLD-MCNC: 7.8 G/DL (ref 12.1–17)
IMM GRANULOCYTES # BLD AUTO: 0.1 K/UL (ref 0–0.04)
IMM GRANULOCYTES NFR BLD AUTO: 1 % (ref 0–0.5)
LYMPHOCYTES # BLD: 1.5 K/UL (ref 0.8–3.5)
LYMPHOCYTES NFR BLD: 14 % (ref 12–49)
MCH RBC QN AUTO: 29.8 PG (ref 26–34)
MCHC RBC AUTO-ENTMCNC: 32.8 G/DL (ref 30–36.5)
MCV RBC AUTO: 90.8 FL (ref 80–99)
MONOCYTES # BLD: 0.7 K/UL (ref 0–1)
MONOCYTES NFR BLD: 6 % (ref 5–13)
NEUTS SEG # BLD: 8.5 K/UL (ref 1.8–8)
NEUTS SEG NFR BLD: 78 % (ref 32–75)
NRBC # BLD: 0 K/UL (ref 0–0.01)
NRBC BLD-RTO: 0 PER 100 WBC
PHOSPHATE SERPL-MCNC: 2.3 MG/DL (ref 2.6–4.7)
PLATELET # BLD AUTO: 113 K/UL (ref 150–400)
PMV BLD AUTO: 10.9 FL (ref 8.9–12.9)
POTASSIUM SERPL-SCNC: 3.5 MMOL/L (ref 3.5–5.1)
PROT SERPL-MCNC: 5.6 G/DL (ref 6.4–8.2)
RBC # BLD AUTO: 2.62 M/UL (ref 4.1–5.7)
SERVICE CMNT-IMP: ABNORMAL
SERVICE CMNT-IMP: NORMAL
SODIUM SERPL-SCNC: 145 MMOL/L (ref 136–145)
WBC # BLD AUTO: 10.9 K/UL (ref 4.1–11.1)

## 2024-10-22 PROCEDURE — 80053 COMPREHEN METABOLIC PANEL: CPT

## 2024-10-22 PROCEDURE — 87075 CULTR BACTERIA EXCEPT BLOOD: CPT

## 2024-10-22 PROCEDURE — 10061 I&D ABSCESS COMP/MULTIPLE: CPT | Performed by: SURGERY

## 2024-10-22 PROCEDURE — 36415 COLL VENOUS BLD VENIPUNCTURE: CPT

## 2024-10-22 PROCEDURE — 97530 THERAPEUTIC ACTIVITIES: CPT

## 2024-10-22 PROCEDURE — 82962 GLUCOSE BLOOD TEST: CPT

## 2024-10-22 PROCEDURE — 2580000003 HC RX 258: Performed by: STUDENT IN AN ORGANIZED HEALTH CARE EDUCATION/TRAINING PROGRAM

## 2024-10-22 PROCEDURE — 97165 OT EVAL LOW COMPLEX 30 MIN: CPT

## 2024-10-22 PROCEDURE — 6370000000 HC RX 637 (ALT 250 FOR IP): Performed by: NURSE PRACTITIONER

## 2024-10-22 PROCEDURE — 2580000003 HC RX 258: Performed by: INTERNAL MEDICINE

## 2024-10-22 PROCEDURE — 6370000000 HC RX 637 (ALT 250 FOR IP): Performed by: STUDENT IN AN ORGANIZED HEALTH CARE EDUCATION/TRAINING PROGRAM

## 2024-10-22 PROCEDURE — 6360000002 HC RX W HCPCS: Performed by: NURSE PRACTITIONER

## 2024-10-22 PROCEDURE — 2060000000 HC ICU INTERMEDIATE R&B

## 2024-10-22 PROCEDURE — 97161 PT EVAL LOW COMPLEX 20 MIN: CPT

## 2024-10-22 PROCEDURE — 87070 CULTURE OTHR SPECIMN AEROBIC: CPT

## 2024-10-22 PROCEDURE — 2500000003 HC RX 250 WO HCPCS: Performed by: NURSE PRACTITIONER

## 2024-10-22 PROCEDURE — 87205 SMEAR GRAM STAIN: CPT

## 2024-10-22 PROCEDURE — 84100 ASSAY OF PHOSPHORUS: CPT

## 2024-10-22 PROCEDURE — 2580000003 HC RX 258: Performed by: NURSE PRACTITIONER

## 2024-10-22 PROCEDURE — 85025 COMPLETE CBC W/AUTO DIFF WBC: CPT

## 2024-10-22 PROCEDURE — 6370000000 HC RX 637 (ALT 250 FOR IP): Performed by: INTERNAL MEDICINE

## 2024-10-22 RX ORDER — SODIUM CHLORIDE, SODIUM LACTATE, POTASSIUM CHLORIDE, AND CALCIUM CHLORIDE .6; .31; .03; .02 G/100ML; G/100ML; G/100ML; G/100ML
500 INJECTION, SOLUTION INTRAVENOUS ONCE
Status: COMPLETED | OUTPATIENT
Start: 2024-10-22 | End: 2024-10-22

## 2024-10-22 RX ORDER — LIDOCAINE HYDROCHLORIDE AND EPINEPHRINE 10; 10 MG/ML; UG/ML
20 INJECTION, SOLUTION INFILTRATION; PERINEURAL ONCE
Status: COMPLETED | OUTPATIENT
Start: 2024-10-22 | End: 2024-10-22

## 2024-10-22 RX ORDER — HYDROXYZINE HYDROCHLORIDE 10 MG/1
10 TABLET, FILM COATED ORAL ONCE
Status: COMPLETED | OUTPATIENT
Start: 2024-10-22 | End: 2024-10-22

## 2024-10-22 RX ADMIN — SODIUM CHLORIDE, PRESERVATIVE FREE 10 ML: 5 INJECTION INTRAVENOUS at 09:13

## 2024-10-22 RX ADMIN — LIDOCAINE HYDROCHLORIDE,EPINEPHRINE BITARTRATE 20 ML: 10; .01 INJECTION, SOLUTION INFILTRATION; PERINEURAL at 12:41

## 2024-10-22 RX ADMIN — PANTOPRAZOLE SODIUM 40 MG: 40 TABLET, DELAYED RELEASE ORAL at 17:08

## 2024-10-22 RX ADMIN — Medication: at 09:13

## 2024-10-22 RX ADMIN — SODIUM CHLORIDE, POTASSIUM CHLORIDE, SODIUM LACTATE AND CALCIUM CHLORIDE 500 ML: 600; 310; 30; 20 INJECTION, SOLUTION INTRAVENOUS at 10:28

## 2024-10-22 RX ADMIN — SODIUM CHLORIDE, POTASSIUM CHLORIDE, SODIUM LACTATE AND CALCIUM CHLORIDE 500 ML: 600; 310; 30; 20 INJECTION, SOLUTION INTRAVENOUS at 13:02

## 2024-10-22 RX ADMIN — FOLIC ACID 1 MG: 1 TABLET ORAL at 09:12

## 2024-10-22 RX ADMIN — Medication 1 AMPULE: at 09:11

## 2024-10-22 RX ADMIN — Medication 1 TABLET: at 09:12

## 2024-10-22 RX ADMIN — THIAMINE HYDROCHLORIDE 250 MG: 100 INJECTION, SOLUTION INTRAMUSCULAR; INTRAVENOUS at 22:27

## 2024-10-22 RX ADMIN — Medication: at 22:28

## 2024-10-22 RX ADMIN — Medication 1 AMPULE: at 22:28

## 2024-10-22 RX ADMIN — SODIUM CHLORIDE, PRESERVATIVE FREE 10 ML: 5 INJECTION INTRAVENOUS at 22:27

## 2024-10-22 RX ADMIN — HYDROXYZINE HYDROCHLORIDE 10 MG: 10 TABLET ORAL at 22:27

## 2024-10-22 NOTE — WOUND CARE
Wound care consult for \"mass\" on his back and other lesions noted by nursing on admission skin assessment.  Chart reviewed and patient assessed.  Pt. Is 64 years old an he was admitted due to a fall at home and he was found by his family incoherent. Dx'd with encephalopathy possibly from uremia and electrolyte issues along with possible alcohol withdrawal.   Past Medical History:   Diagnosis Date    Alcohol abuse    CKD III, GI bleed, esophagitis. He is on CIWA protocol.    Past Surgical History:   Procedure Laterality Date    UPPER GASTROINTESTINAL ENDOSCOPY N/A 10/18/2024    ESOPHAGOGASTRODUODENOSCOPY performed by Sukumar Ramos MD at [facility] ENDOSCOPY     Assessment; Pt. Sat up with some help. He was able to stay up once he was assisted to a seated position. Pt. Has a cyst that is \"sore\" to palpate on the end of the scapula area. There is a large blackhead comedo on the mid back.         Treatment Recommended:  He needs a thorough soap and water bath with a scrub to the back.   Plan: NS wet gauze with a foam dressing needs to be applied to the mid back black head to soften it.   The cyst is stable.  May just be a lipoma (?).   Encourage safe mobility when possible.  Allow him to walk assisted to the bathroom from the chair.   Selene Vu, RN, BSN, CWON

## 2024-10-23 ENCOUNTER — TELEPHONE (OUTPATIENT)
Facility: HOSPITAL | Age: 64
End: 2024-10-23

## 2024-10-23 LAB
25(OH)D3 SERPL-MCNC: 13.5 NG/ML (ref 30–100)
ALBUMIN SERPL-MCNC: 2.3 G/DL (ref 3.5–5)
ALBUMIN/GLOB SERPL: 0.7 (ref 1.1–2.2)
ALP SERPL-CCNC: 63 U/L (ref 45–117)
ALT SERPL-CCNC: 24 U/L (ref 12–78)
ANION GAP SERPL CALC-SCNC: 5 MMOL/L (ref 2–12)
AST SERPL-CCNC: 22 U/L (ref 15–37)
BILIRUB SERPL-MCNC: 0.8 MG/DL (ref 0.2–1)
BUN SERPL-MCNC: 19 MG/DL (ref 6–20)
BUN/CREAT SERPL: 16 (ref 12–20)
CALCIUM SERPL-MCNC: 8 MG/DL (ref 8.5–10.1)
CHLORIDE SERPL-SCNC: 112 MMOL/L (ref 97–108)
CO2 SERPL-SCNC: 28 MMOL/L (ref 21–32)
CREAT SERPL-MCNC: 1.22 MG/DL (ref 0.7–1.3)
FOLATE SERPL-MCNC: 5.2 NG/ML (ref 5–21)
GLOBULIN SER CALC-MCNC: 3.2 G/DL (ref 2–4)
GLUCOSE BLD STRIP.AUTO-MCNC: 108 MG/DL (ref 65–117)
GLUCOSE BLD STRIP.AUTO-MCNC: 121 MG/DL (ref 65–117)
GLUCOSE BLD STRIP.AUTO-MCNC: 70 MG/DL (ref 65–117)
GLUCOSE BLD STRIP.AUTO-MCNC: 93 MG/DL (ref 65–117)
GLUCOSE SERPL-MCNC: 101 MG/DL (ref 65–100)
HCT VFR BLD AUTO: 23.2 % (ref 36.6–50.3)
HGB BLD-MCNC: 7.8 G/DL (ref 12.1–17)
PHOSPHATE SERPL-MCNC: 1.8 MG/DL (ref 2.6–4.7)
POTASSIUM SERPL-SCNC: 3.1 MMOL/L (ref 3.5–5.1)
PROT SERPL-MCNC: 5.5 G/DL (ref 6.4–8.2)
SERVICE CMNT-IMP: ABNORMAL
SERVICE CMNT-IMP: NORMAL
SODIUM SERPL-SCNC: 145 MMOL/L (ref 136–145)
T4 FREE SERPL-MCNC: 1.2 NG/DL (ref 0.8–1.5)
TSH SERPL DL<=0.05 MIU/L-ACNC: 2.19 UIU/ML (ref 0.36–3.74)
VIT B12 SERPL-MCNC: 943 PG/ML (ref 193–986)

## 2024-10-23 PROCEDURE — 95816 EEG AWAKE AND DROWSY: CPT | Performed by: INTERNAL MEDICINE

## 2024-10-23 PROCEDURE — 84443 ASSAY THYROID STIM HORMONE: CPT

## 2024-10-23 PROCEDURE — 2580000003 HC RX 258: Performed by: NURSE PRACTITIONER

## 2024-10-23 PROCEDURE — 2060000000 HC ICU INTERMEDIATE R&B

## 2024-10-23 PROCEDURE — 82306 VITAMIN D 25 HYDROXY: CPT

## 2024-10-23 PROCEDURE — 51702 INSERT TEMP BLADDER CATH: CPT

## 2024-10-23 PROCEDURE — 80053 COMPREHEN METABOLIC PANEL: CPT

## 2024-10-23 PROCEDURE — 6360000002 HC RX W HCPCS: Performed by: INTERNAL MEDICINE

## 2024-10-23 PROCEDURE — 82962 GLUCOSE BLOOD TEST: CPT

## 2024-10-23 PROCEDURE — 6370000000 HC RX 637 (ALT 250 FOR IP): Performed by: INTERNAL MEDICINE

## 2024-10-23 PROCEDURE — 51798 US URINE CAPACITY MEASURE: CPT

## 2024-10-23 PROCEDURE — 82746 ASSAY OF FOLIC ACID SERUM: CPT

## 2024-10-23 PROCEDURE — 6370000000 HC RX 637 (ALT 250 FOR IP): Performed by: NURSE PRACTITIONER

## 2024-10-23 PROCEDURE — 99222 1ST HOSP IP/OBS MODERATE 55: CPT | Performed by: INTERNAL MEDICINE

## 2024-10-23 PROCEDURE — 85014 HEMATOCRIT: CPT

## 2024-10-23 PROCEDURE — 85018 HEMOGLOBIN: CPT

## 2024-10-23 PROCEDURE — 95819 EEG AWAKE AND ASLEEP: CPT

## 2024-10-23 PROCEDURE — 6370000000 HC RX 637 (ALT 250 FOR IP): Performed by: STUDENT IN AN ORGANIZED HEALTH CARE EDUCATION/TRAINING PROGRAM

## 2024-10-23 PROCEDURE — 84439 ASSAY OF FREE THYROXINE: CPT

## 2024-10-23 PROCEDURE — 36415 COLL VENOUS BLD VENIPUNCTURE: CPT

## 2024-10-23 PROCEDURE — 2580000003 HC RX 258: Performed by: INTERNAL MEDICINE

## 2024-10-23 PROCEDURE — 82607 VITAMIN B-12: CPT

## 2024-10-23 PROCEDURE — 84100 ASSAY OF PHOSPHORUS: CPT

## 2024-10-23 RX ORDER — POTASSIUM CHLORIDE 1500 MG/1
40 TABLET, EXTENDED RELEASE ORAL ONCE
Status: COMPLETED | OUTPATIENT
Start: 2024-10-23 | End: 2024-10-23

## 2024-10-23 RX ORDER — NICOTINE 21 MG/24HR
1 PATCH, TRANSDERMAL 24 HOURS TRANSDERMAL DAILY
Status: DISCONTINUED | OUTPATIENT
Start: 2024-10-23 | End: 2024-11-05 | Stop reason: HOSPADM

## 2024-10-23 RX ORDER — MAGNESIUM SULFATE IN WATER 40 MG/ML
2000 INJECTION, SOLUTION INTRAVENOUS ONCE
Status: COMPLETED | OUTPATIENT
Start: 2024-10-23 | End: 2024-10-23

## 2024-10-23 RX ORDER — ZIPRASIDONE MESYLATE 20 MG/ML
10 INJECTION, POWDER, LYOPHILIZED, FOR SOLUTION INTRAMUSCULAR EVERY 6 HOURS PRN
Status: DISCONTINUED | OUTPATIENT
Start: 2024-10-23 | End: 2024-11-03

## 2024-10-23 RX ADMIN — Medication 1 TABLET: at 09:56

## 2024-10-23 RX ADMIN — MAGNESIUM SULFATE HEPTAHYDRATE 2000 MG: 40 INJECTION, SOLUTION INTRAVENOUS at 10:04

## 2024-10-23 RX ADMIN — Medication: at 12:34

## 2024-10-23 RX ADMIN — Medication 100 MG: at 20:53

## 2024-10-23 RX ADMIN — PANTOPRAZOLE SODIUM 40 MG: 40 TABLET, DELAYED RELEASE ORAL at 16:16

## 2024-10-23 RX ADMIN — FOLIC ACID 1 MG: 1 TABLET ORAL at 09:56

## 2024-10-23 RX ADMIN — Medication: at 20:16

## 2024-10-23 RX ADMIN — POTASSIUM CHLORIDE 40 MEQ: 1500 TABLET, EXTENDED RELEASE ORAL at 09:56

## 2024-10-23 RX ADMIN — PANTOPRAZOLE SODIUM 40 MG: 40 TABLET, DELAYED RELEASE ORAL at 06:42

## 2024-10-23 RX ADMIN — SODIUM CHLORIDE, PRESERVATIVE FREE 10 ML: 5 INJECTION INTRAVENOUS at 09:56

## 2024-10-23 RX ADMIN — Medication 1 AMPULE: at 09:57

## 2024-10-23 RX ADMIN — SODIUM CHLORIDE, PRESERVATIVE FREE 10 ML: 5 INJECTION INTRAVENOUS at 20:16

## 2024-10-23 RX ADMIN — Medication 1 AMPULE: at 20:16

## 2024-10-23 RX ADMIN — SODIUM CHLORIDE, PRESERVATIVE FREE 10 ML: 5 INJECTION INTRAVENOUS at 20:17

## 2024-10-23 NOTE — PROCEDURES
regions.     During drowsiness, there is attenuation of the posterior dominant rhythm, roving horizontal eye movements and slower frequencies.    During the recording, the patient did not achieve stage II sleep.    There are no epileptiform discharges, electrographic seizures or clinical events of concern.     Single channel EKG shows regular rate and rhythm.    Clinical Interpretation:  This EEG, performed during wakefulness and drowsiness/sleep, is abnormal due to mild to moderate generalized slowing which is consistent with global cerebral dysfunction which can be suggestive of a toxic-metabolic encephalopathies of nonspecific etiology or in the setting of underlying dementia. There is no focal asymmetry, seizures or epileptiform discharges seen. The absence of epileptiform activity neither excludes nor supports the diagnosis of epilepsy. If further suspicion persists, would recommend obtaining a continuous EEG study. Clinical correlation recommended.     Diamond Scott D.O.

## 2024-10-23 NOTE — TELEPHONE ENCOUNTER
Hi can we set this patient up for hospital follow-up in 6 to 8 weeks, for neuropsych testing and evaluation for mild cognitive impairment thank you

## 2024-10-24 LAB
ALBUMIN SERPL-MCNC: 2.5 G/DL (ref 3.5–5)
ALBUMIN/GLOB SERPL: 0.7 (ref 1.1–2.2)
ALP SERPL-CCNC: 72 U/L (ref 45–117)
ALT SERPL-CCNC: 26 U/L (ref 12–78)
ANION GAP SERPL CALC-SCNC: 4 MMOL/L (ref 2–12)
AST SERPL-CCNC: 30 U/L (ref 15–37)
BACTERIA SPEC CULT: ABNORMAL
BACTERIA SPEC CULT: ABNORMAL
BACTERIA SPEC CULT: NORMAL
BILIRUB SERPL-MCNC: 1 MG/DL (ref 0.2–1)
BUN SERPL-MCNC: 15 MG/DL (ref 6–20)
BUN/CREAT SERPL: 13 (ref 12–20)
CALCIUM SERPL-MCNC: 8.1 MG/DL (ref 8.5–10.1)
CHLORIDE SERPL-SCNC: 111 MMOL/L (ref 97–108)
CO2 SERPL-SCNC: 27 MMOL/L (ref 21–32)
CREAT SERPL-MCNC: 1.19 MG/DL (ref 0.7–1.3)
GLOBULIN SER CALC-MCNC: 3.8 G/DL (ref 2–4)
GLUCOSE BLD STRIP.AUTO-MCNC: 146 MG/DL (ref 65–117)
GLUCOSE BLD STRIP.AUTO-MCNC: 162 MG/DL (ref 65–117)
GLUCOSE BLD STRIP.AUTO-MCNC: 87 MG/DL (ref 65–117)
GLUCOSE BLD STRIP.AUTO-MCNC: 97 MG/DL (ref 65–117)
GLUCOSE SERPL-MCNC: 72 MG/DL (ref 65–100)
GRAM STN SPEC: NORMAL
GRAM STN SPEC: NORMAL
HCT VFR BLD AUTO: 24.1 % (ref 36.6–50.3)
HGB BLD-MCNC: 8.1 G/DL (ref 12.1–17)
PHOSPHATE SERPL-MCNC: 2 MG/DL (ref 2.6–4.7)
POTASSIUM SERPL-SCNC: 4 MMOL/L (ref 3.5–5.1)
PROT SERPL-MCNC: 6.3 G/DL (ref 6.4–8.2)
SERVICE CMNT-IMP: ABNORMAL
SERVICE CMNT-IMP: NORMAL
SODIUM SERPL-SCNC: 142 MMOL/L (ref 136–145)

## 2024-10-24 PROCEDURE — 80053 COMPREHEN METABOLIC PANEL: CPT

## 2024-10-24 PROCEDURE — 6370000000 HC RX 637 (ALT 250 FOR IP): Performed by: NURSE PRACTITIONER

## 2024-10-24 PROCEDURE — 36415 COLL VENOUS BLD VENIPUNCTURE: CPT

## 2024-10-24 PROCEDURE — 84100 ASSAY OF PHOSPHORUS: CPT

## 2024-10-24 PROCEDURE — 6370000000 HC RX 637 (ALT 250 FOR IP): Performed by: INTERNAL MEDICINE

## 2024-10-24 PROCEDURE — 99024 POSTOP FOLLOW-UP VISIT: CPT | Performed by: NURSE PRACTITIONER

## 2024-10-24 PROCEDURE — 97530 THERAPEUTIC ACTIVITIES: CPT | Performed by: PHYSICAL THERAPIST

## 2024-10-24 PROCEDURE — 82962 GLUCOSE BLOOD TEST: CPT

## 2024-10-24 PROCEDURE — 97535 SELF CARE MNGMENT TRAINING: CPT

## 2024-10-24 PROCEDURE — 85014 HEMATOCRIT: CPT

## 2024-10-24 PROCEDURE — 97116 GAIT TRAINING THERAPY: CPT | Performed by: PHYSICAL THERAPIST

## 2024-10-24 PROCEDURE — 6370000000 HC RX 637 (ALT 250 FOR IP): Performed by: STUDENT IN AN ORGANIZED HEALTH CARE EDUCATION/TRAINING PROGRAM

## 2024-10-24 PROCEDURE — 2580000003 HC RX 258: Performed by: NURSE PRACTITIONER

## 2024-10-24 PROCEDURE — 2060000000 HC ICU INTERMEDIATE R&B

## 2024-10-24 PROCEDURE — 85018 HEMOGLOBIN: CPT

## 2024-10-24 PROCEDURE — 2580000003 HC RX 258: Performed by: INTERNAL MEDICINE

## 2024-10-24 RX ADMIN — Medication 100 MG: at 08:25

## 2024-10-24 RX ADMIN — FOLIC ACID 1 MG: 1 TABLET ORAL at 08:25

## 2024-10-24 RX ADMIN — Medication 250 MG: at 20:42

## 2024-10-24 RX ADMIN — PANTOPRAZOLE SODIUM 40 MG: 40 TABLET, DELAYED RELEASE ORAL at 14:18

## 2024-10-24 RX ADMIN — Medication: at 08:23

## 2024-10-24 RX ADMIN — SODIUM CHLORIDE, PRESERVATIVE FREE 10 ML: 5 INJECTION INTRAVENOUS at 20:42

## 2024-10-24 RX ADMIN — Medication 1 AMPULE: at 08:25

## 2024-10-24 RX ADMIN — Medication 1 AMPULE: at 20:42

## 2024-10-24 RX ADMIN — Medication 250 MG: at 14:18

## 2024-10-24 RX ADMIN — Medication 250 MG: at 18:44

## 2024-10-24 RX ADMIN — Medication 250 MG: at 08:40

## 2024-10-24 RX ADMIN — Medication 1 TABLET: at 08:25

## 2024-10-24 RX ADMIN — Medication: at 20:42

## 2024-10-24 RX ADMIN — SODIUM CHLORIDE, PRESERVATIVE FREE 10 ML: 5 INJECTION INTRAVENOUS at 08:26

## 2024-10-24 RX ADMIN — PANTOPRAZOLE SODIUM 40 MG: 40 TABLET, DELAYED RELEASE ORAL at 06:45

## 2024-10-24 RX ADMIN — SODIUM CHLORIDE, PRESERVATIVE FREE 10 ML: 5 INJECTION INTRAVENOUS at 20:45

## 2024-10-24 ASSESSMENT — PAIN SCALES - GENERAL: PAINLEVEL_OUTOF10: 0

## 2024-10-25 LAB
GLUCOSE BLD STRIP.AUTO-MCNC: 111 MG/DL (ref 65–117)
GLUCOSE BLD STRIP.AUTO-MCNC: 135 MG/DL (ref 65–117)
GLUCOSE BLD STRIP.AUTO-MCNC: 166 MG/DL (ref 65–117)
GLUCOSE BLD STRIP.AUTO-MCNC: 92 MG/DL (ref 65–117)
SERVICE CMNT-IMP: ABNORMAL
SERVICE CMNT-IMP: ABNORMAL
SERVICE CMNT-IMP: NORMAL
SERVICE CMNT-IMP: NORMAL

## 2024-10-25 PROCEDURE — 97535 SELF CARE MNGMENT TRAINING: CPT

## 2024-10-25 PROCEDURE — 6370000000 HC RX 637 (ALT 250 FOR IP): Performed by: NURSE PRACTITIONER

## 2024-10-25 PROCEDURE — 6370000000 HC RX 637 (ALT 250 FOR IP): Performed by: STUDENT IN AN ORGANIZED HEALTH CARE EDUCATION/TRAINING PROGRAM

## 2024-10-25 PROCEDURE — 82962 GLUCOSE BLOOD TEST: CPT

## 2024-10-25 PROCEDURE — 97116 GAIT TRAINING THERAPY: CPT | Performed by: PHYSICAL THERAPIST

## 2024-10-25 PROCEDURE — 2580000003 HC RX 258: Performed by: INTERNAL MEDICINE

## 2024-10-25 PROCEDURE — 6370000000 HC RX 637 (ALT 250 FOR IP): Performed by: INTERNAL MEDICINE

## 2024-10-25 PROCEDURE — 2060000000 HC ICU INTERMEDIATE R&B

## 2024-10-25 RX ADMIN — Medication 1 AMPULE: at 10:58

## 2024-10-25 RX ADMIN — Medication 250 MG: at 20:48

## 2024-10-25 RX ADMIN — FOLIC ACID 1 MG: 1 TABLET ORAL at 10:52

## 2024-10-25 RX ADMIN — Medication 100 MG: at 10:52

## 2024-10-25 RX ADMIN — PANTOPRAZOLE SODIUM 40 MG: 40 TABLET, DELAYED RELEASE ORAL at 06:34

## 2024-10-25 RX ADMIN — Medication 1 TABLET: at 10:52

## 2024-10-25 RX ADMIN — SODIUM CHLORIDE, PRESERVATIVE FREE 10 ML: 5 INJECTION INTRAVENOUS at 10:53

## 2024-10-25 RX ADMIN — Medication 250 MG: at 11:39

## 2024-10-25 RX ADMIN — Medication 1 AMPULE: at 20:57

## 2024-10-25 RX ADMIN — Medication: at 20:56

## 2024-10-25 RX ADMIN — PANTOPRAZOLE SODIUM 40 MG: 40 TABLET, DELAYED RELEASE ORAL at 15:31

## 2024-10-25 RX ADMIN — SODIUM CHLORIDE, PRESERVATIVE FREE 10 ML: 5 INJECTION INTRAVENOUS at 20:49

## 2024-10-25 RX ADMIN — Medication: at 10:52

## 2024-10-25 RX ADMIN — Medication 250 MG: at 15:31

## 2024-10-25 ASSESSMENT — PAIN SCALES - GENERAL
PAINLEVEL_OUTOF10: 0
PAINLEVEL_OUTOF10: 0

## 2024-10-26 LAB
GLUCOSE BLD STRIP.AUTO-MCNC: 175 MG/DL (ref 65–117)
GLUCOSE BLD STRIP.AUTO-MCNC: 78 MG/DL (ref 65–117)
GLUCOSE BLD STRIP.AUTO-MCNC: 93 MG/DL (ref 65–117)
GLUCOSE BLD STRIP.AUTO-MCNC: 97 MG/DL (ref 65–117)
SERVICE CMNT-IMP: ABNORMAL
SERVICE CMNT-IMP: NORMAL

## 2024-10-26 PROCEDURE — 2060000000 HC ICU INTERMEDIATE R&B

## 2024-10-26 PROCEDURE — 2580000003 HC RX 258: Performed by: INTERNAL MEDICINE

## 2024-10-26 PROCEDURE — 6370000000 HC RX 637 (ALT 250 FOR IP): Performed by: INTERNAL MEDICINE

## 2024-10-26 PROCEDURE — 6370000000 HC RX 637 (ALT 250 FOR IP): Performed by: STUDENT IN AN ORGANIZED HEALTH CARE EDUCATION/TRAINING PROGRAM

## 2024-10-26 PROCEDURE — 6370000000 HC RX 637 (ALT 250 FOR IP): Performed by: NURSE PRACTITIONER

## 2024-10-26 PROCEDURE — 82962 GLUCOSE BLOOD TEST: CPT

## 2024-10-26 RX ADMIN — SODIUM CHLORIDE, PRESERVATIVE FREE 10 ML: 5 INJECTION INTRAVENOUS at 20:56

## 2024-10-26 RX ADMIN — PANTOPRAZOLE SODIUM 40 MG: 40 TABLET, DELAYED RELEASE ORAL at 06:25

## 2024-10-26 RX ADMIN — PANTOPRAZOLE SODIUM 40 MG: 40 TABLET, DELAYED RELEASE ORAL at 18:01

## 2024-10-26 RX ADMIN — Medication: at 20:47

## 2024-10-26 RX ADMIN — Medication 1 TABLET: at 10:27

## 2024-10-26 RX ADMIN — SODIUM CHLORIDE, PRESERVATIVE FREE 10 ML: 5 INJECTION INTRAVENOUS at 10:25

## 2024-10-26 RX ADMIN — Medication: at 10:25

## 2024-10-26 RX ADMIN — Medication 1 AMPULE: at 20:48

## 2024-10-26 RX ADMIN — Medication 100 MG: at 10:27

## 2024-10-26 RX ADMIN — FOLIC ACID 1 MG: 1 TABLET ORAL at 10:27

## 2024-10-26 ASSESSMENT — PAIN SCALES - GENERAL
PAINLEVEL_OUTOF10: 0

## 2024-10-27 LAB
GLUCOSE BLD STRIP.AUTO-MCNC: 112 MG/DL (ref 65–117)
GLUCOSE BLD STRIP.AUTO-MCNC: 128 MG/DL (ref 65–117)
GLUCOSE BLD STRIP.AUTO-MCNC: 143 MG/DL (ref 65–117)
GLUCOSE BLD STRIP.AUTO-MCNC: 89 MG/DL (ref 65–117)
SERVICE CMNT-IMP: ABNORMAL
SERVICE CMNT-IMP: ABNORMAL
SERVICE CMNT-IMP: NORMAL
SERVICE CMNT-IMP: NORMAL

## 2024-10-27 PROCEDURE — 6370000000 HC RX 637 (ALT 250 FOR IP): Performed by: STUDENT IN AN ORGANIZED HEALTH CARE EDUCATION/TRAINING PROGRAM

## 2024-10-27 PROCEDURE — 6370000000 HC RX 637 (ALT 250 FOR IP): Performed by: INTERNAL MEDICINE

## 2024-10-27 PROCEDURE — 82962 GLUCOSE BLOOD TEST: CPT

## 2024-10-27 PROCEDURE — 2580000003 HC RX 258: Performed by: INTERNAL MEDICINE

## 2024-10-27 PROCEDURE — 2060000000 HC ICU INTERMEDIATE R&B

## 2024-10-27 PROCEDURE — 6370000000 HC RX 637 (ALT 250 FOR IP): Performed by: NURSE PRACTITIONER

## 2024-10-27 RX ADMIN — PANTOPRAZOLE SODIUM 40 MG: 40 TABLET, DELAYED RELEASE ORAL at 16:31

## 2024-10-27 RX ADMIN — PANTOPRAZOLE SODIUM 40 MG: 40 TABLET, DELAYED RELEASE ORAL at 06:32

## 2024-10-27 RX ADMIN — SODIUM CHLORIDE, PRESERVATIVE FREE 10 ML: 5 INJECTION INTRAVENOUS at 21:08

## 2024-10-27 RX ADMIN — Medication 1 AMPULE: at 10:37

## 2024-10-27 RX ADMIN — SODIUM CHLORIDE, PRESERVATIVE FREE 10 ML: 5 INJECTION INTRAVENOUS at 10:37

## 2024-10-27 RX ADMIN — Medication 1 TABLET: at 10:36

## 2024-10-27 RX ADMIN — Medication: at 10:37

## 2024-10-27 RX ADMIN — Medication 1 AMPULE: at 21:07

## 2024-10-27 RX ADMIN — FOLIC ACID 1 MG: 1 TABLET ORAL at 10:36

## 2024-10-27 RX ADMIN — Medication 100 MG: at 10:36

## 2024-10-27 ASSESSMENT — PAIN SCALES - GENERAL
PAINLEVEL_OUTOF10: 0

## 2024-10-28 LAB
GLUCOSE BLD STRIP.AUTO-MCNC: 102 MG/DL (ref 65–117)
GLUCOSE BLD STRIP.AUTO-MCNC: 116 MG/DL (ref 65–117)
GLUCOSE BLD STRIP.AUTO-MCNC: 145 MG/DL (ref 65–117)
GLUCOSE BLD STRIP.AUTO-MCNC: 157 MG/DL (ref 65–117)
SERVICE CMNT-IMP: ABNORMAL
SERVICE CMNT-IMP: ABNORMAL
SERVICE CMNT-IMP: NORMAL
SERVICE CMNT-IMP: NORMAL

## 2024-10-28 PROCEDURE — 97535 SELF CARE MNGMENT TRAINING: CPT

## 2024-10-28 PROCEDURE — 6370000000 HC RX 637 (ALT 250 FOR IP): Performed by: INTERNAL MEDICINE

## 2024-10-28 PROCEDURE — 2580000003 HC RX 258: Performed by: INTERNAL MEDICINE

## 2024-10-28 PROCEDURE — 6370000000 HC RX 637 (ALT 250 FOR IP): Performed by: STUDENT IN AN ORGANIZED HEALTH CARE EDUCATION/TRAINING PROGRAM

## 2024-10-28 PROCEDURE — 2060000000 HC ICU INTERMEDIATE R&B

## 2024-10-28 PROCEDURE — 97530 THERAPEUTIC ACTIVITIES: CPT

## 2024-10-28 PROCEDURE — 6370000000 HC RX 637 (ALT 250 FOR IP): Performed by: NURSE PRACTITIONER

## 2024-10-28 PROCEDURE — 82962 GLUCOSE BLOOD TEST: CPT

## 2024-10-28 PROCEDURE — 97116 GAIT TRAINING THERAPY: CPT

## 2024-10-28 RX ADMIN — Medication 1 TABLET: at 08:06

## 2024-10-28 RX ADMIN — SODIUM CHLORIDE, PRESERVATIVE FREE 10 ML: 5 INJECTION INTRAVENOUS at 08:06

## 2024-10-28 RX ADMIN — Medication: at 08:05

## 2024-10-28 RX ADMIN — ACETAMINOPHEN 650 MG: 325 TABLET ORAL at 20:30

## 2024-10-28 RX ADMIN — Medication 100 MG: at 08:05

## 2024-10-28 RX ADMIN — PANTOPRAZOLE SODIUM 40 MG: 40 TABLET, DELAYED RELEASE ORAL at 06:09

## 2024-10-28 RX ADMIN — FOLIC ACID 1 MG: 1 TABLET ORAL at 08:06

## 2024-10-28 RX ADMIN — SODIUM CHLORIDE, PRESERVATIVE FREE 10 ML: 5 INJECTION INTRAVENOUS at 20:28

## 2024-10-28 RX ADMIN — PANTOPRAZOLE SODIUM 40 MG: 40 TABLET, DELAYED RELEASE ORAL at 17:08

## 2024-10-28 RX ADMIN — Medication 1 AMPULE: at 20:28

## 2024-10-28 RX ADMIN — Medication 1 AMPULE: at 08:05

## 2024-10-28 ASSESSMENT — PAIN SCALES - GENERAL
PAINLEVEL_OUTOF10: 0
PAINLEVEL_OUTOF10: 0
PAINLEVEL_OUTOF10: 8
PAINLEVEL_OUTOF10: 0
PAINLEVEL_OUTOF10: 5
PAINLEVEL_OUTOF10: 10
PAINLEVEL_OUTOF10: 8

## 2024-10-28 ASSESSMENT — PAIN DESCRIPTION - ORIENTATION
ORIENTATION: RIGHT

## 2024-10-28 ASSESSMENT — PAIN DESCRIPTION - LOCATION
LOCATION: FOOT

## 2024-10-28 ASSESSMENT — PAIN DESCRIPTION - DESCRIPTORS
DESCRIPTORS: ACHING
DESCRIPTORS: ACHING;SORE;TENDER
DESCRIPTORS: ACHING;SORE;TENDER
DESCRIPTORS: ACHING

## 2024-10-29 ENCOUNTER — APPOINTMENT (OUTPATIENT)
Facility: HOSPITAL | Age: 64
DRG: 242 | End: 2024-10-29
Payer: MEDICAID

## 2024-10-29 LAB
GLUCOSE BLD STRIP.AUTO-MCNC: 125 MG/DL (ref 65–117)
GLUCOSE BLD STRIP.AUTO-MCNC: 130 MG/DL (ref 65–117)
GLUCOSE BLD STRIP.AUTO-MCNC: 165 MG/DL (ref 65–117)
GLUCOSE BLD STRIP.AUTO-MCNC: 92 MG/DL (ref 65–117)
SERVICE CMNT-IMP: ABNORMAL
SERVICE CMNT-IMP: NORMAL
URATE SERPL-MCNC: 4 MG/DL (ref 3.5–7.2)

## 2024-10-29 PROCEDURE — 6370000000 HC RX 637 (ALT 250 FOR IP): Performed by: STUDENT IN AN ORGANIZED HEALTH CARE EDUCATION/TRAINING PROGRAM

## 2024-10-29 PROCEDURE — 6370000000 HC RX 637 (ALT 250 FOR IP): Performed by: INTERNAL MEDICINE

## 2024-10-29 PROCEDURE — 6370000000 HC RX 637 (ALT 250 FOR IP): Performed by: NURSE PRACTITIONER

## 2024-10-29 PROCEDURE — 2580000003 HC RX 258: Performed by: INTERNAL MEDICINE

## 2024-10-29 PROCEDURE — 84550 ASSAY OF BLOOD/URIC ACID: CPT

## 2024-10-29 PROCEDURE — 82962 GLUCOSE BLOOD TEST: CPT

## 2024-10-29 PROCEDURE — 36415 COLL VENOUS BLD VENIPUNCTURE: CPT

## 2024-10-29 PROCEDURE — 1100000000 HC RM PRIVATE

## 2024-10-29 PROCEDURE — 73630 X-RAY EXAM OF FOOT: CPT

## 2024-10-29 PROCEDURE — 97535 SELF CARE MNGMENT TRAINING: CPT

## 2024-10-29 RX ADMIN — FOLIC ACID 1 MG: 1 TABLET ORAL at 08:39

## 2024-10-29 RX ADMIN — PANTOPRAZOLE SODIUM 40 MG: 40 TABLET, DELAYED RELEASE ORAL at 16:43

## 2024-10-29 RX ADMIN — SODIUM CHLORIDE, PRESERVATIVE FREE 10 ML: 5 INJECTION INTRAVENOUS at 08:40

## 2024-10-29 RX ADMIN — PANTOPRAZOLE SODIUM 40 MG: 40 TABLET, DELAYED RELEASE ORAL at 06:18

## 2024-10-29 RX ADMIN — ACETAMINOPHEN 650 MG: 325 TABLET ORAL at 08:39

## 2024-10-29 RX ADMIN — Medication 1 AMPULE: at 08:40

## 2024-10-29 RX ADMIN — Medication 100 MG: at 08:39

## 2024-10-29 RX ADMIN — Medication: at 08:40

## 2024-10-29 RX ADMIN — Medication 1 TABLET: at 08:38

## 2024-10-29 RX ADMIN — ACETAMINOPHEN 650 MG: 325 TABLET ORAL at 02:43

## 2024-10-29 ASSESSMENT — PAIN SCALES - GENERAL
PAINLEVEL_OUTOF10: 9
PAINLEVEL_OUTOF10: 0
PAINLEVEL_OUTOF10: 3
PAINLEVEL_OUTOF10: 0
PAINLEVEL_OUTOF10: 0

## 2024-10-29 ASSESSMENT — PAIN DESCRIPTION - LOCATION
LOCATION: FOOT
LOCATION: ANKLE

## 2024-10-29 ASSESSMENT — PAIN SCALES - WONG BAKER: WONGBAKER_NUMERICALRESPONSE: NO HURT

## 2024-10-29 ASSESSMENT — PAIN DESCRIPTION - DESCRIPTORS
DESCRIPTORS: ACHING
DESCRIPTORS: ACHING;SORE;TENDER

## 2024-10-29 ASSESSMENT — PAIN DESCRIPTION - ORIENTATION
ORIENTATION: RIGHT
ORIENTATION: RIGHT

## 2024-10-30 LAB
GLUCOSE BLD STRIP.AUTO-MCNC: 113 MG/DL (ref 65–117)
GLUCOSE BLD STRIP.AUTO-MCNC: 120 MG/DL (ref 65–117)
GLUCOSE BLD STRIP.AUTO-MCNC: 215 MG/DL (ref 65–117)
GLUCOSE BLD STRIP.AUTO-MCNC: 89 MG/DL (ref 65–117)
SERVICE CMNT-IMP: ABNORMAL
SERVICE CMNT-IMP: ABNORMAL
SERVICE CMNT-IMP: NORMAL
SERVICE CMNT-IMP: NORMAL

## 2024-10-30 PROCEDURE — 1100000000 HC RM PRIVATE

## 2024-10-30 PROCEDURE — 6370000000 HC RX 637 (ALT 250 FOR IP): Performed by: NURSE PRACTITIONER

## 2024-10-30 PROCEDURE — 82962 GLUCOSE BLOOD TEST: CPT

## 2024-10-30 PROCEDURE — 6370000000 HC RX 637 (ALT 250 FOR IP): Performed by: INTERNAL MEDICINE

## 2024-10-30 PROCEDURE — 6370000000 HC RX 637 (ALT 250 FOR IP): Performed by: STUDENT IN AN ORGANIZED HEALTH CARE EDUCATION/TRAINING PROGRAM

## 2024-10-30 RX ADMIN — INSULIN LISPRO 1 UNITS: 100 INJECTION, SOLUTION INTRAVENOUS; SUBCUTANEOUS at 21:30

## 2024-10-30 RX ADMIN — ACETAMINOPHEN 650 MG: 325 TABLET ORAL at 21:10

## 2024-10-30 RX ADMIN — PANTOPRAZOLE SODIUM 40 MG: 40 TABLET, DELAYED RELEASE ORAL at 16:17

## 2024-10-30 RX ADMIN — Medication 100 MG: at 08:37

## 2024-10-30 RX ADMIN — Medication 1 AMPULE: at 06:10

## 2024-10-30 RX ADMIN — Medication: at 08:46

## 2024-10-30 RX ADMIN — ACETAMINOPHEN 650 MG: 325 TABLET ORAL at 06:51

## 2024-10-30 RX ADMIN — Medication 1 TABLET: at 08:37

## 2024-10-30 RX ADMIN — FOLIC ACID 1 MG: 1 TABLET ORAL at 08:37

## 2024-10-30 RX ADMIN — Medication 1 AMPULE: at 21:07

## 2024-10-30 RX ADMIN — PANTOPRAZOLE SODIUM 40 MG: 40 TABLET, DELAYED RELEASE ORAL at 06:07

## 2024-10-30 RX ADMIN — Medication: at 21:09

## 2024-10-30 RX ADMIN — ACETAMINOPHEN 650 MG: 325 TABLET ORAL at 16:16

## 2024-10-30 ASSESSMENT — PAIN DESCRIPTION - LOCATION
LOCATION: ANKLE
LOCATION: FOOT

## 2024-10-30 ASSESSMENT — PAIN SCALES - GENERAL
PAINLEVEL_OUTOF10: 4
PAINLEVEL_OUTOF10: 7
PAINLEVEL_OUTOF10: 8

## 2024-10-30 ASSESSMENT — PAIN DESCRIPTION - DESCRIPTORS: DESCRIPTORS: ACHING

## 2024-10-30 ASSESSMENT — PAIN DESCRIPTION - ORIENTATION: ORIENTATION: RIGHT

## 2024-10-31 ENCOUNTER — APPOINTMENT (OUTPATIENT)
Facility: HOSPITAL | Age: 64
DRG: 242 | End: 2024-10-31
Payer: MEDICAID

## 2024-10-31 LAB
ECHO BSA: 1.67 M2
GLUCOSE BLD STRIP.AUTO-MCNC: 105 MG/DL (ref 65–117)
GLUCOSE BLD STRIP.AUTO-MCNC: 115 MG/DL (ref 65–117)
GLUCOSE BLD STRIP.AUTO-MCNC: 134 MG/DL (ref 65–117)
GLUCOSE BLD STRIP.AUTO-MCNC: 158 MG/DL (ref 65–117)
SERVICE CMNT-IMP: ABNORMAL
SERVICE CMNT-IMP: ABNORMAL
SERVICE CMNT-IMP: NORMAL
SERVICE CMNT-IMP: NORMAL

## 2024-10-31 PROCEDURE — 6370000000 HC RX 637 (ALT 250 FOR IP): Performed by: NURSE PRACTITIONER

## 2024-10-31 PROCEDURE — 6370000000 HC RX 637 (ALT 250 FOR IP): Performed by: INTERNAL MEDICINE

## 2024-10-31 PROCEDURE — 82962 GLUCOSE BLOOD TEST: CPT

## 2024-10-31 PROCEDURE — 6370000000 HC RX 637 (ALT 250 FOR IP): Performed by: STUDENT IN AN ORGANIZED HEALTH CARE EDUCATION/TRAINING PROGRAM

## 2024-10-31 PROCEDURE — 97535 SELF CARE MNGMENT TRAINING: CPT

## 2024-10-31 PROCEDURE — 97116 GAIT TRAINING THERAPY: CPT

## 2024-10-31 PROCEDURE — 1100000000 HC RM PRIVATE

## 2024-10-31 PROCEDURE — 93971 EXTREMITY STUDY: CPT

## 2024-10-31 PROCEDURE — 73700 CT LOWER EXTREMITY W/O DYE: CPT

## 2024-10-31 RX ORDER — PREDNISONE 20 MG/1
40 TABLET ORAL ONCE
Status: COMPLETED | OUTPATIENT
Start: 2024-10-31 | End: 2024-10-31

## 2024-10-31 RX ADMIN — ACETAMINOPHEN 650 MG: 325 TABLET ORAL at 03:39

## 2024-10-31 RX ADMIN — FOLIC ACID 1 MG: 1 TABLET ORAL at 08:41

## 2024-10-31 RX ADMIN — Medication 1 TABLET: at 08:41

## 2024-10-31 RX ADMIN — Medication 100 MG: at 08:41

## 2024-10-31 RX ADMIN — PREDNISONE 40 MG: 20 TABLET ORAL at 21:45

## 2024-10-31 RX ADMIN — PANTOPRAZOLE SODIUM 40 MG: 40 TABLET, DELAYED RELEASE ORAL at 06:31

## 2024-10-31 RX ADMIN — Medication: at 08:46

## 2024-10-31 RX ADMIN — PANTOPRAZOLE SODIUM 40 MG: 40 TABLET, DELAYED RELEASE ORAL at 15:37

## 2024-10-31 RX ADMIN — Medication 1 AMPULE: at 21:46

## 2024-10-31 RX ADMIN — ACETAMINOPHEN 650 MG: 325 TABLET ORAL at 21:46

## 2024-10-31 RX ADMIN — Medication 1 AMPULE: at 08:41

## 2024-10-31 ASSESSMENT — PAIN DESCRIPTION - LOCATION
LOCATION: ANKLE
LOCATION: FOOT

## 2024-10-31 ASSESSMENT — PAIN DESCRIPTION - ORIENTATION
ORIENTATION: RIGHT

## 2024-10-31 ASSESSMENT — PAIN SCALES - GENERAL
PAINLEVEL_OUTOF10: 4
PAINLEVEL_OUTOF10: 7
PAINLEVEL_OUTOF10: 3
PAINLEVEL_OUTOF10: 5

## 2024-10-31 ASSESSMENT — PAIN DESCRIPTION - DESCRIPTORS
DESCRIPTORS: DISCOMFORT
DESCRIPTORS: ACHING

## 2024-11-01 PROBLEM — S93.401A RIGHT ANKLE SPRAIN: Status: ACTIVE | Noted: 2024-11-01

## 2024-11-01 LAB
ANION GAP SERPL CALC-SCNC: 6 MMOL/L (ref 2–12)
BUN SERPL-MCNC: 14 MG/DL (ref 6–20)
BUN/CREAT SERPL: 13 (ref 12–20)
CALCIUM SERPL-MCNC: 7.7 MG/DL (ref 8.5–10.1)
CHLORIDE SERPL-SCNC: 106 MMOL/L (ref 97–108)
CO2 SERPL-SCNC: 26 MMOL/L (ref 21–32)
CREAT SERPL-MCNC: 1.09 MG/DL (ref 0.7–1.3)
ERYTHROCYTE [DISTWIDTH] IN BLOOD BY AUTOMATED COUNT: 15.4 % (ref 11.5–14.5)
GLUCOSE BLD STRIP.AUTO-MCNC: 146 MG/DL (ref 65–117)
GLUCOSE BLD STRIP.AUTO-MCNC: 168 MG/DL (ref 65–117)
GLUCOSE BLD STRIP.AUTO-MCNC: 218 MG/DL (ref 65–117)
GLUCOSE BLD STRIP.AUTO-MCNC: 221 MG/DL (ref 65–117)
GLUCOSE SERPL-MCNC: 220 MG/DL (ref 65–100)
HCT VFR BLD AUTO: 19.9 % (ref 36.6–50.3)
HCT VFR BLD AUTO: 22.8 % (ref 36.6–50.3)
HGB BLD-MCNC: 6.5 G/DL (ref 12.1–17)
HGB BLD-MCNC: 7.7 G/DL (ref 12.1–17)
HISTORY CHECK: NORMAL
MAGNESIUM SERPL-MCNC: 1.3 MG/DL (ref 1.6–2.4)
MCH RBC QN AUTO: 30.2 PG (ref 26–34)
MCHC RBC AUTO-ENTMCNC: 32.7 G/DL (ref 30–36.5)
MCV RBC AUTO: 92.6 FL (ref 80–99)
NRBC # BLD: 0 K/UL (ref 0–0.01)
NRBC BLD-RTO: 0 PER 100 WBC
PLATELET # BLD AUTO: 374 K/UL (ref 150–400)
PMV BLD AUTO: 9.5 FL (ref 8.9–12.9)
POTASSIUM SERPL-SCNC: 3.9 MMOL/L (ref 3.5–5.1)
RBC # BLD AUTO: 2.15 M/UL (ref 4.1–5.7)
SERVICE CMNT-IMP: ABNORMAL
SODIUM SERPL-SCNC: 138 MMOL/L (ref 136–145)
WBC # BLD AUTO: 5.4 K/UL (ref 4.1–11.1)

## 2024-11-01 PROCEDURE — 85018 HEMOGLOBIN: CPT

## 2024-11-01 PROCEDURE — 86901 BLOOD TYPING SEROLOGIC RH(D): CPT

## 2024-11-01 PROCEDURE — 2580000003 HC RX 258: Performed by: INTERNAL MEDICINE

## 2024-11-01 PROCEDURE — 6360000002 HC RX W HCPCS: Performed by: INTERNAL MEDICINE

## 2024-11-01 PROCEDURE — 6370000000 HC RX 637 (ALT 250 FOR IP): Performed by: STUDENT IN AN ORGANIZED HEALTH CARE EDUCATION/TRAINING PROGRAM

## 2024-11-01 PROCEDURE — 6370000000 HC RX 637 (ALT 250 FOR IP): Performed by: INTERNAL MEDICINE

## 2024-11-01 PROCEDURE — P9016 RBC LEUKOCYTES REDUCED: HCPCS

## 2024-11-01 PROCEDURE — 6370000000 HC RX 637 (ALT 250 FOR IP): Performed by: NURSE PRACTITIONER

## 2024-11-01 PROCEDURE — 86900 BLOOD TYPING SEROLOGIC ABO: CPT

## 2024-11-01 PROCEDURE — 36415 COLL VENOUS BLD VENIPUNCTURE: CPT

## 2024-11-01 PROCEDURE — 85014 HEMATOCRIT: CPT

## 2024-11-01 PROCEDURE — 80048 BASIC METABOLIC PNL TOTAL CA: CPT

## 2024-11-01 PROCEDURE — 99252 IP/OBS CONSLTJ NEW/EST SF 35: CPT | Performed by: PHYSICIAN ASSISTANT

## 2024-11-01 PROCEDURE — 86850 RBC ANTIBODY SCREEN: CPT

## 2024-11-01 PROCEDURE — 85027 COMPLETE CBC AUTOMATED: CPT

## 2024-11-01 PROCEDURE — 82962 GLUCOSE BLOOD TEST: CPT

## 2024-11-01 PROCEDURE — 83735 ASSAY OF MAGNESIUM: CPT

## 2024-11-01 PROCEDURE — 86923 COMPATIBILITY TEST ELECTRIC: CPT

## 2024-11-01 PROCEDURE — 1100000000 HC RM PRIVATE

## 2024-11-01 PROCEDURE — 36430 TRANSFUSION BLD/BLD COMPNT: CPT

## 2024-11-01 RX ORDER — SODIUM CHLORIDE 9 MG/ML
INJECTION, SOLUTION INTRAVENOUS PRN
Status: DISCONTINUED | OUTPATIENT
Start: 2024-11-01 | End: 2024-11-05 | Stop reason: HOSPADM

## 2024-11-01 RX ORDER — MAGNESIUM SULFATE IN WATER 40 MG/ML
2000 INJECTION, SOLUTION INTRAVENOUS ONCE
Status: COMPLETED | OUTPATIENT
Start: 2024-11-01 | End: 2024-11-01

## 2024-11-01 RX ADMIN — Medication 1 TABLET: at 08:52

## 2024-11-01 RX ADMIN — Medication 1 AMPULE: at 21:39

## 2024-11-01 RX ADMIN — SODIUM CHLORIDE, PRESERVATIVE FREE 10 ML: 5 INJECTION INTRAVENOUS at 21:38

## 2024-11-01 RX ADMIN — PANTOPRAZOLE SODIUM 40 MG: 40 TABLET, DELAYED RELEASE ORAL at 08:52

## 2024-11-01 RX ADMIN — Medication 100 MG: at 08:52

## 2024-11-01 RX ADMIN — PANTOPRAZOLE SODIUM 40 MG: 40 TABLET, DELAYED RELEASE ORAL at 17:06

## 2024-11-01 RX ADMIN — ACETAMINOPHEN 650 MG: 325 TABLET ORAL at 21:40

## 2024-11-01 RX ADMIN — FOLIC ACID 1 MG: 1 TABLET ORAL at 08:52

## 2024-11-01 RX ADMIN — INSULIN LISPRO 1 UNITS: 100 INJECTION, SOLUTION INTRAVENOUS; SUBCUTANEOUS at 11:51

## 2024-11-01 RX ADMIN — ENOXAPARIN SODIUM 30 MG: 100 INJECTION SUBCUTANEOUS at 08:53

## 2024-11-01 RX ADMIN — Medication 1 AMPULE: at 11:52

## 2024-11-01 RX ADMIN — MAGNESIUM SULFATE HEPTAHYDRATE 2000 MG: 40 INJECTION, SOLUTION INTRAVENOUS at 13:23

## 2024-11-01 ASSESSMENT — PAIN SCALES - GENERAL: PAINLEVEL_OUTOF10: 3

## 2024-11-01 NOTE — CONSULTS
New Urology Consult Note    Patient: Getachew Durham MRN: [id number]  SSN: [id number]    YOB: 1960  Age: 64 y.o.  Sex: male            Assessment:     Metabolic encephalopathy  Hypokalemia  Retention of urine    Recommendations:     1.  Urinary retention with low PVR patient due to void independently.  Send no need for catheter at this time.  However patient is very lethargic and not able to follow commands patient likely will need a bedside cystoscopy in order to place Durham catheter if it is needed.      Urology will reassess in the morning    Thank you for this consult. Please contact [facility] with any further questions/concerns.    D/w Dr Espino    History of Present Illness:     Reason for Consult: Urinary retention    Getachew Durham is seen in consultation for reasons noted above at the request of Renato Agarwal MD.    This is a 64 y.o. male with a history of other than ETOH abuse (1/5/day) and tobacco use who presented to ER on 10/16/24 after his wife (does not live with him) found him at the bottom of the stairs after an unknown downtime. He reported that he fell but is unable to provide details 2/2 confusion. Upon further workup he was noted to have K 2.1, chloride 83, BUN/creat 132/4.11 Ag 25, lactic acid 14.27, BNP 3,591 with WBC 17. He was given 2 liters LR as well as cefepime for concerns of sepsis. Urology has been consiulted to see pt for cathteter placement     Patient is not known to [facility] he is seen here in consultation.  Patient confused and lethargic unable to provide any history.  Nurse reports patient has not independently voided bladder scan was only 225 mL chart review also shows admission with renal function of 4.11 now at 3.26 upon entering into the room urinalysis noted at bedside with 175 mL tea colored urine.  Due to elevated creatinine and unknown baseline due to complaints of for accurate intake and output while in the critical 
Consult Note            Date:10/22/2024        Patient Name:Getachew Durham     YOB: 1960     Age:64 y.o.    Inpatient consult to General Surgery  Consult performed by: Juaquin Goff MD  Consult ordered by: Amanda Patterson MD  Reason for consult: back abscess      I had an extensive discussion with Getachew Durham regarding the risks, benefits, and alternatives of proceeding with an incision and drainage  Risks of surgery including the risk of anesthesia, bleeding, infection, injury to underlying structures, recurrence, need for repeat or more extensive procedures, and the lack of symptomatic improvement were discussed and he is in agreement to proceed.    Agree with Abxs    Wound care instructions placed.    F/u wound cultures    Thank you for this consult.            History Obtained From   patient, electronic medical record    History of Present Illness   63 yo with back abscess      Past Medical History     Past Medical History:   Diagnosis Date    Alcohol abuse         Past Surgical History     Past Surgical History:   Procedure Laterality Date    UPPER GASTROINTESTINAL ENDOSCOPY N/A 10/18/2024    ESOPHAGOGASTRODUODENOSCOPY performed by Sukumar Ramos MD at [facility] ENDOSCOPY        Medications     Prior to Admission medications    Not on File        lidocaine-EPINEPHrine 1 %-1:[id number] injection 20 mL, Once  sodium chloride flush 0.9 % injection 5-40 mL, 2 times per day  sodium chloride flush 0.9 % injection 5-40 mL, PRN  0.9 % sodium chloride infusion, PRN  LORazepam (ATIVAN) tablet 1 mg, Q1H PRN   Or  LORazepam (ATIVAN) injection 1 mg, Q1H PRN   Or  LORazepam (ATIVAN) tablet 2 mg, Q1H PRN   Or  LORazepam (ATIVAN) injection 2 mg, Q1H PRN   Or  LORazepam (ATIVAN) tablet 3 mg, Q1H PRN   Or  LORazepam (ATIVAN) injection 3 mg, Q1H PRN   Or  LORazepam (ATIVAN) tablet 4 mg, Q1H PRN   Or  LORazepam (ATIVAN) injection 4 mg, Q1H PRN  pantoprazole (PROTONIX) tablet 40 mg, BID AC  balsum peru-castor oil 
Date of Consultation:  October 23, 2024    Referring Physician: MD Scooter    Reason for Consultation: Confusion    Chief Complaint   Patient presents with    Altered Mental Status     Pt arrives by EMS for AMS. EMS reports that patient's wife found him on floor at the bottom of the stairs. EMS states that patient reported tripping on first step on the stairs at home. They report AMS increasing in route. Pt's wife reports patient does not take medications and she does not live with the patient.       History of Present Illness:   Getachew Durham is a 64 y.o. male with history of alcohol use disorder, tobacco use disorder, who initially presented with acute encephalopathy that was multifactorial due to uremia and electrolyte derangements, concern for acute GI bleed and esophagitis, with anemia status posttransfusion.     Patient is sitting up in a chair this morning.  He does not have any complaints to me.  He is a little disoriented about where he is and the date however the day prior he was able to correctly state the date.  He has not been getting very good sleep while in the hospital.    Head CT was done on 10/16 which was nonacute.  He does have some periventricular white matter disease secondary to chronic small vessel ischemic changes.    Labs are notable for anemia, potassium 3.1 improving creatinine this morning at 1.22.    He tells me that he lives by himself he is recently  from his wife.  He states that he is independent of all his activities of daily living including taking his medications, cleaning, cooking, driving.      Past Medical History:   Diagnosis Date    Alcohol abuse         Past Surgical History:   Procedure Laterality Date    UPPER GASTROINTESTINAL ENDOSCOPY N/A 10/18/2024    ESOPHAGOGASTRODUODENOSCOPY performed by Sukumar Ramos MD at [facility] ENDOSCOPY        History reviewed. No pertinent family history.     Social History     Tobacco Use    Smoking status: Every Day     Types: 
Orthopedic CONSULT NOTE    Subjective:     Date of Consultation:  November 1, 2024      Getachew Durham is a 64 y.o. male who is being seen for right ankle pain and swelling. Injury occurred  POA while Pt. was at home, was found after unwitnessed fall.admitted to the hospital with AMS  Workup has revealed ankle effusion omn CT. Patient's ambulatory status includes None and their living environment is home. Denies history of gout .  He is able to bear weight and feels its getting a little better.  Pain is lateral ankle . Denies fevers.    Patient Active Problem List    Diagnosis Date Noted    Right ankle sprain 11/01/2024    Severe protein-calorie malnutrition (HCC) 10/17/2024    Alcohol withdrawal seizure with delirium (HCC) 10/16/2024     History reviewed. No pertinent family history.   Social History     Tobacco Use    Smoking status: Every Day     Types: Cigarettes    Smokeless tobacco: Not on file   Substance Use Topics    Alcohol use: Yes     Past Medical History:   Diagnosis Date    Alcohol abuse       Past Surgical History:   Procedure Laterality Date    UPPER GASTROINTESTINAL ENDOSCOPY N/A 10/18/2024    ESOPHAGOGASTRODUODENOSCOPY performed by Sukumar Ramos MD at [facility] ENDOSCOPY      Prior to Admission medications    Not on File     Current Facility-Administered Medications   Medication Dose Route Frequency    0.9 % sodium chloride infusion   IntraVENous PRN    ziprasidone (GEODON) injection 10 mg  10 mg IntraMUSCular Q6H PRN    nicotine (NICODERM CQ) 14 MG/24HR 1 patch  1 patch TransDERmal Daily    sodium chloride flush 0.9 % injection 5-40 mL  5-40 mL IntraVENous 2 times per day    sodium chloride flush 0.9 % injection 5-40 mL  5-40 mL IntraVENous PRN    0.9 % sodium chloride infusion   IntraVENous PRN    LORazepam (ATIVAN) tablet 1 mg  1 mg Oral Q1H PRN    Or    LORazepam (ATIVAN) injection 1 mg  1 mg IntraVENous Q1H PRN    Or    LORazepam (ATIVAN) tablet 2 mg  2 mg Oral Q1H PRN    Or    LORazepam 
Collection Time: 10/17/24  5:18 PM   Result Value Ref Range    Hemoglobin 7.8 (L) 12.1 - 17.0 g/dL    Hematocrit 22.0 (L) 36.6 - 50.3 %   POCT Glucose    Collection Time: 10/17/24  5:24 PM   Result Value Ref Range    POC Glucose 121 (H) 65 - 117 mg/dL    Performed by: David Mukherjee RN    POCT Glucose    Collection Time: 10/17/24  8:03 PM   Result Value Ref Range    POC Glucose 112 65 - 117 mg/dL    Performed by: Chato Peterson RN    Comprehensive Metabolic Panel    Collection Time: 10/17/24  8:37 PM   Result Value Ref Range    Sodium 139 136 - 145 mmol/L    Potassium 3.0 (L) 3.5 - 5.1 mmol/L    Chloride 100 97 - 108 mmol/L    CO2 34 (H) 21 - 32 mmol/L    Anion Gap 5 2 - 12 mmol/L    Glucose 111 (H) 65 - 100 mg/dL     (H) 6 - 20 MG/DL    Creatinine 3.07 (H) 0.70 - 1.30 MG/DL    BUN/Creatinine Ratio 38 (H) 12 - 20      Est, Glom Filt Rate 22 (L) >60 ml/min/1.73m2    Calcium 7.7 (L) 8.5 - 10.1 MG/DL    Total Bilirubin 0.9 0.2 - 1.0 MG/DL    ALT 19 12 - 78 U/L    AST 32 15 - 37 U/L    Alk Phosphatase 42 (L) 45 - 117 U/L    Total Protein 5.3 (L) 6.4 - 8.2 g/dL    Albumin 2.4 (L) 3.5 - 5.0 g/dL    Globulin 2.9 2.0 - 4.0 g/dL    Albumin/Globulin Ratio 0.8 (L) 1.1 - 2.2     Magnesium    Collection Time: 10/17/24  8:37 PM   Result Value Ref Range    Magnesium 2.3 1.6 - 2.4 mg/dL   Phosphorus    Collection Time: 10/17/24  8:37 PM   Result Value Ref Range    Phosphorus 2.0 (L) 2.6 - 4.7 MG/DL   Phosphorus    Collection Time: 10/18/24  4:48 AM   Result Value Ref Range    Phosphorus 1.4 (L) 2.6 - 4.7 MG/DL   Magnesium    Collection Time: 10/18/24  4:48 AM   Result Value Ref Range    Magnesium 2.2 1.6 - 2.4 mg/dL   Comprehensive Metabolic Panel    Collection Time: 10/18/24  4:48 AM   Result Value Ref Range    Sodium 141 136 - 145 mmol/L    Potassium 3.6 3.5 - 5.1 mmol/L    Chloride 104 97 - 108 mmol/L    CO2 29 21 - 32 mmol/L    Anion Gap 8 2 - 12 mmol/L    Glucose 104 (H) 65 - 100 mg/dL     (H) 6 - 20 MG/DL

## 2024-11-01 NOTE — CONSENT
Informed Consent for Blood Component Transfusion Note    I have discussed with the patient the rationale for blood component transfusion; its benefits in treating or preventing fatigue, organ damage, or death; and its risk which includes mild transfusion reactions, rare risk of blood borne infection, or more serious but rare reactions. I have discussed the alternatives to transfusion, including the risk and consequences of not receiving transfusion. The patient had an opportunity to ask questions and had agreed to proceed with transfusion of blood components.    Electronically signed by Jean Monroe MD on 11/1/24 at 1:41 PM EDT

## 2024-11-02 LAB
ABO + RH BLD: NORMAL
BLD PROD TYP BPU: NORMAL
BLOOD BANK BLOOD PRODUCT EXPIRATION DATE: NORMAL
BLOOD BANK DISPENSE STATUS: NORMAL
BLOOD BANK ISBT PRODUCT BLOOD TYPE: 5100
BLOOD BANK PRODUCT CODE: NORMAL
BLOOD BANK UNIT TYPE AND RH: NORMAL
BLOOD GROUP ANTIBODIES SERPL: NORMAL
BPU ID: NORMAL
CROSSMATCH RESULT: NORMAL
GLUCOSE BLD STRIP.AUTO-MCNC: 104 MG/DL (ref 65–117)
SERVICE CMNT-IMP: NORMAL
SPECIMEN EXP DATE BLD: NORMAL
UNIT DIVISION: 0
UNIT ISSUE DATE/TIME: NORMAL

## 2024-11-02 PROCEDURE — 1100000000 HC RM PRIVATE

## 2024-11-02 PROCEDURE — 6370000000 HC RX 637 (ALT 250 FOR IP): Performed by: INTERNAL MEDICINE

## 2024-11-02 PROCEDURE — 82962 GLUCOSE BLOOD TEST: CPT

## 2024-11-02 PROCEDURE — 6360000002 HC RX W HCPCS: Performed by: INTERNAL MEDICINE

## 2024-11-02 PROCEDURE — 6370000000 HC RX 637 (ALT 250 FOR IP): Performed by: NURSE PRACTITIONER

## 2024-11-02 PROCEDURE — 2580000003 HC RX 258: Performed by: INTERNAL MEDICINE

## 2024-11-02 PROCEDURE — 6370000000 HC RX 637 (ALT 250 FOR IP): Performed by: STUDENT IN AN ORGANIZED HEALTH CARE EDUCATION/TRAINING PROGRAM

## 2024-11-02 RX ADMIN — ENOXAPARIN SODIUM 30 MG: 100 INJECTION SUBCUTANEOUS at 09:40

## 2024-11-02 RX ADMIN — PANTOPRAZOLE SODIUM 40 MG: 40 TABLET, DELAYED RELEASE ORAL at 09:40

## 2024-11-02 RX ADMIN — Medication 1 AMPULE: at 21:10

## 2024-11-02 RX ADMIN — FOLIC ACID 1 MG: 1 TABLET ORAL at 09:40

## 2024-11-02 RX ADMIN — Medication: at 21:12

## 2024-11-02 RX ADMIN — SODIUM CHLORIDE, PRESERVATIVE FREE 10 ML: 5 INJECTION INTRAVENOUS at 21:12

## 2024-11-02 RX ADMIN — Medication 100 MG: at 09:40

## 2024-11-02 RX ADMIN — Medication 1 TABLET: at 09:40

## 2024-11-02 RX ADMIN — Medication 1 AMPULE: at 09:43

## 2024-11-02 RX ADMIN — SODIUM CHLORIDE, PRESERVATIVE FREE 10 ML: 5 INJECTION INTRAVENOUS at 09:41

## 2024-11-02 RX ADMIN — PANTOPRAZOLE SODIUM 40 MG: 40 TABLET, DELAYED RELEASE ORAL at 16:43

## 2024-11-02 ASSESSMENT — PAIN SCALES - GENERAL: PAINLEVEL_OUTOF10: 0

## 2024-11-03 PROCEDURE — 6370000000 HC RX 637 (ALT 250 FOR IP): Performed by: STUDENT IN AN ORGANIZED HEALTH CARE EDUCATION/TRAINING PROGRAM

## 2024-11-03 PROCEDURE — 6370000000 HC RX 637 (ALT 250 FOR IP): Performed by: INTERNAL MEDICINE

## 2024-11-03 PROCEDURE — 6370000000 HC RX 637 (ALT 250 FOR IP): Performed by: NURSE PRACTITIONER

## 2024-11-03 PROCEDURE — 1100000000 HC RM PRIVATE

## 2024-11-03 PROCEDURE — 6360000002 HC RX W HCPCS: Performed by: INTERNAL MEDICINE

## 2024-11-03 PROCEDURE — 2580000003 HC RX 258: Performed by: INTERNAL MEDICINE

## 2024-11-03 RX ORDER — ENOXAPARIN SODIUM 100 MG/ML
40 INJECTION SUBCUTANEOUS DAILY
Status: DISCONTINUED | OUTPATIENT
Start: 2024-11-04 | End: 2024-11-05 | Stop reason: HOSPADM

## 2024-11-03 RX ADMIN — SODIUM CHLORIDE, PRESERVATIVE FREE 10 ML: 5 INJECTION INTRAVENOUS at 09:18

## 2024-11-03 RX ADMIN — Medication 1 AMPULE: at 09:17

## 2024-11-03 RX ADMIN — PANTOPRAZOLE SODIUM 40 MG: 40 TABLET, DELAYED RELEASE ORAL at 16:30

## 2024-11-03 RX ADMIN — FOLIC ACID 1 MG: 1 TABLET ORAL at 09:17

## 2024-11-03 RX ADMIN — SODIUM CHLORIDE, PRESERVATIVE FREE 10 ML: 5 INJECTION INTRAVENOUS at 22:07

## 2024-11-03 RX ADMIN — Medication: at 22:07

## 2024-11-03 RX ADMIN — Medication 1 AMPULE: at 22:07

## 2024-11-03 RX ADMIN — Medication 100 MG: at 09:17

## 2024-11-03 RX ADMIN — Medication 1 TABLET: at 09:17

## 2024-11-03 RX ADMIN — ENOXAPARIN SODIUM 30 MG: 100 INJECTION SUBCUTANEOUS at 09:17

## 2024-11-03 RX ADMIN — PANTOPRAZOLE SODIUM 40 MG: 40 TABLET, DELAYED RELEASE ORAL at 09:17

## 2024-11-03 RX ADMIN — ACETAMINOPHEN 650 MG: 325 TABLET ORAL at 09:25

## 2024-11-03 ASSESSMENT — PAIN SCALES - GENERAL
PAINLEVEL_OUTOF10: 6
PAINLEVEL_OUTOF10: 0
PAINLEVEL_OUTOF10: 4

## 2024-11-03 ASSESSMENT — PAIN DESCRIPTION - LOCATION: LOCATION: FOOT

## 2024-11-03 ASSESSMENT — PAIN DESCRIPTION - DESCRIPTORS: DESCRIPTORS: ACHING

## 2024-11-03 ASSESSMENT — PAIN DESCRIPTION - ORIENTATION: ORIENTATION: RIGHT

## 2024-11-04 LAB
BASOPHILS # BLD: 0 K/UL (ref 0–0.1)
BASOPHILS NFR BLD: 0 % (ref 0–1)
DIFFERENTIAL METHOD BLD: ABNORMAL
EOSINOPHIL # BLD: 0.1 K/UL (ref 0–0.4)
EOSINOPHIL NFR BLD: 1 % (ref 0–7)
ERYTHROCYTE [DISTWIDTH] IN BLOOD BY AUTOMATED COUNT: 15.9 % (ref 11.5–14.5)
HCT VFR BLD AUTO: 21.9 % (ref 36.6–50.3)
HGB BLD-MCNC: 7.1 G/DL (ref 12.1–17)
IMM GRANULOCYTES # BLD AUTO: 0.1 K/UL (ref 0–0.04)
IMM GRANULOCYTES NFR BLD AUTO: 2 % (ref 0–0.5)
LYMPHOCYTES # BLD: 1.6 K/UL (ref 0.8–3.5)
LYMPHOCYTES NFR BLD: 23 % (ref 12–49)
MCH RBC QN AUTO: 30.2 PG (ref 26–34)
MCHC RBC AUTO-ENTMCNC: 32.4 G/DL (ref 30–36.5)
MCV RBC AUTO: 93.2 FL (ref 80–99)
MONOCYTES # BLD: 0.4 K/UL (ref 0–1)
MONOCYTES NFR BLD: 6 % (ref 5–13)
NEUTS SEG # BLD: 4.7 K/UL (ref 1.8–8)
NEUTS SEG NFR BLD: 68 % (ref 32–75)
NRBC # BLD: 0 K/UL (ref 0–0.01)
NRBC BLD-RTO: 0 PER 100 WBC
PLATELET # BLD AUTO: 257 K/UL (ref 150–400)
PMV BLD AUTO: 8.8 FL (ref 8.9–12.9)
RBC # BLD AUTO: 2.35 M/UL (ref 4.1–5.7)
WBC # BLD AUTO: 6.9 K/UL (ref 4.1–11.1)

## 2024-11-04 PROCEDURE — 97116 GAIT TRAINING THERAPY: CPT

## 2024-11-04 PROCEDURE — 1100000000 HC RM PRIVATE

## 2024-11-04 PROCEDURE — 6370000000 HC RX 637 (ALT 250 FOR IP): Performed by: INTERNAL MEDICINE

## 2024-11-04 PROCEDURE — 97535 SELF CARE MNGMENT TRAINING: CPT

## 2024-11-04 PROCEDURE — 6370000000 HC RX 637 (ALT 250 FOR IP): Performed by: STUDENT IN AN ORGANIZED HEALTH CARE EDUCATION/TRAINING PROGRAM

## 2024-11-04 PROCEDURE — 2580000003 HC RX 258: Performed by: INTERNAL MEDICINE

## 2024-11-04 PROCEDURE — 85025 COMPLETE CBC W/AUTO DIFF WBC: CPT

## 2024-11-04 PROCEDURE — 6370000000 HC RX 637 (ALT 250 FOR IP): Performed by: NURSE PRACTITIONER

## 2024-11-04 PROCEDURE — 36415 COLL VENOUS BLD VENIPUNCTURE: CPT

## 2024-11-04 RX ADMIN — SODIUM CHLORIDE, PRESERVATIVE FREE 10 ML: 5 INJECTION INTRAVENOUS at 09:27

## 2024-11-04 RX ADMIN — ACETAMINOPHEN 650 MG: 325 TABLET ORAL at 17:14

## 2024-11-04 RX ADMIN — SODIUM CHLORIDE, PRESERVATIVE FREE 10 ML: 5 INJECTION INTRAVENOUS at 21:39

## 2024-11-04 RX ADMIN — PANTOPRAZOLE SODIUM 40 MG: 40 TABLET, DELAYED RELEASE ORAL at 17:14

## 2024-11-04 RX ADMIN — Medication 100 MG: at 09:25

## 2024-11-04 RX ADMIN — Medication 1 AMPULE: at 21:39

## 2024-11-04 RX ADMIN — Medication 1 TABLET: at 09:26

## 2024-11-04 RX ADMIN — ACETAMINOPHEN 650 MG: 325 TABLET ORAL at 09:24

## 2024-11-04 RX ADMIN — Medication: at 21:40

## 2024-11-04 RX ADMIN — FOLIC ACID 1 MG: 1 TABLET ORAL at 09:26

## 2024-11-04 ASSESSMENT — PAIN SCALES - GENERAL
PAINLEVEL_OUTOF10: 0
PAINLEVEL_OUTOF10: 3
PAINLEVEL_OUTOF10: 0
PAINLEVEL_OUTOF10: 3
PAINLEVEL_OUTOF10: 0

## 2024-11-04 ASSESSMENT — PAIN DESCRIPTION - ORIENTATION
ORIENTATION: RIGHT
ORIENTATION: RIGHT

## 2024-11-04 ASSESSMENT — PAIN DESCRIPTION - DESCRIPTORS
DESCRIPTORS: SHARP
DESCRIPTORS: SHARP

## 2024-11-04 ASSESSMENT — PAIN DESCRIPTION - LOCATION
LOCATION: ANKLE;FOOT
LOCATION: FOOT;ANKLE

## 2024-11-05 VITALS
BODY MASS INDEX: 17.16 KG/M2 | RESPIRATION RATE: 15 BRPM | TEMPERATURE: 98.8 F | OXYGEN SATURATION: 98 % | HEART RATE: 93 BPM | WEIGHT: 122.58 LBS | DIASTOLIC BLOOD PRESSURE: 60 MMHG | HEIGHT: 71 IN | SYSTOLIC BLOOD PRESSURE: 101 MMHG

## 2024-11-05 LAB
BASOPHILS # BLD: 0 K/UL (ref 0–0.1)
BASOPHILS NFR BLD: 0 % (ref 0–1)
DIFFERENTIAL METHOD BLD: ABNORMAL
EOSINOPHIL # BLD: 0.1 K/UL (ref 0–0.4)
EOSINOPHIL NFR BLD: 1 % (ref 0–7)
ERYTHROCYTE [DISTWIDTH] IN BLOOD BY AUTOMATED COUNT: 15.9 % (ref 11.5–14.5)
HCT VFR BLD AUTO: 21.3 % (ref 36.6–50.3)
HGB BLD-MCNC: 7.1 G/DL (ref 12.1–17)
IMM GRANULOCYTES # BLD AUTO: 0.2 K/UL (ref 0–0.04)
IMM GRANULOCYTES NFR BLD AUTO: 2 % (ref 0–0.5)
LYMPHOCYTES # BLD: 1.8 K/UL (ref 0.8–3.5)
LYMPHOCYTES NFR BLD: 23 % (ref 12–49)
MCH RBC QN AUTO: 30.5 PG (ref 26–34)
MCHC RBC AUTO-ENTMCNC: 33.3 G/DL (ref 30–36.5)
MCV RBC AUTO: 91.4 FL (ref 80–99)
MONOCYTES # BLD: 0.4 K/UL (ref 0–1)
MONOCYTES NFR BLD: 5 % (ref 5–13)
NEUTS SEG # BLD: 5.2 K/UL (ref 1.8–8)
NEUTS SEG NFR BLD: 69 % (ref 32–75)
NRBC # BLD: 0 K/UL (ref 0–0.01)
NRBC BLD-RTO: 0 PER 100 WBC
PLATELET # BLD AUTO: 244 K/UL (ref 150–400)
PMV BLD AUTO: 9.1 FL (ref 8.9–12.9)
RBC # BLD AUTO: 2.33 M/UL (ref 4.1–5.7)
WBC # BLD AUTO: 7.6 K/UL (ref 4.1–11.1)

## 2024-11-05 PROCEDURE — 6370000000 HC RX 637 (ALT 250 FOR IP): Performed by: STUDENT IN AN ORGANIZED HEALTH CARE EDUCATION/TRAINING PROGRAM

## 2024-11-05 PROCEDURE — 85025 COMPLETE CBC W/AUTO DIFF WBC: CPT

## 2024-11-05 PROCEDURE — 36415 COLL VENOUS BLD VENIPUNCTURE: CPT

## 2024-11-05 PROCEDURE — 6370000000 HC RX 637 (ALT 250 FOR IP): Performed by: NURSE PRACTITIONER

## 2024-11-05 PROCEDURE — 6370000000 HC RX 637 (ALT 250 FOR IP): Performed by: INTERNAL MEDICINE

## 2024-11-05 PROCEDURE — 2580000003 HC RX 258: Performed by: INTERNAL MEDICINE

## 2024-11-05 RX ORDER — MAGNESIUM OXIDE 400 MG/1
400 TABLET ORAL 2 TIMES DAILY
Qty: 30 TABLET | Refills: 1 | Status: SHIPPED
Start: 2024-11-05

## 2024-11-05 RX ORDER — PANTOPRAZOLE SODIUM 40 MG/1
40 TABLET, DELAYED RELEASE ORAL
Qty: 30 TABLET | Refills: 3 | Status: SHIPPED
Start: 2024-11-05

## 2024-11-05 RX ORDER — FOLIC ACID 1 MG/1
1 TABLET ORAL DAILY
Qty: 30 TABLET | Refills: 3 | Status: SHIPPED
Start: 2024-11-05

## 2024-11-05 RX ORDER — THIAMINE MONONITRATE (VIT B1) 100 MG
100 TABLET ORAL DAILY
Qty: 30 TABLET | Refills: 0 | Status: SHIPPED
Start: 2024-11-05 | End: 2024-12-05

## 2024-11-05 RX ADMIN — Medication: at 09:07

## 2024-11-05 RX ADMIN — FOLIC ACID 1 MG: 1 TABLET ORAL at 09:06

## 2024-11-05 RX ADMIN — Medication 1 AMPULE: at 09:17

## 2024-11-05 RX ADMIN — Medication 1 TABLET: at 09:06

## 2024-11-05 RX ADMIN — PANTOPRAZOLE SODIUM 40 MG: 40 TABLET, DELAYED RELEASE ORAL at 06:17

## 2024-11-05 RX ADMIN — ACETAMINOPHEN 650 MG: 325 TABLET ORAL at 06:17

## 2024-11-05 RX ADMIN — Medication 100 MG: at 09:06

## 2024-11-05 RX ADMIN — SODIUM CHLORIDE, PRESERVATIVE FREE 10 ML: 5 INJECTION INTRAVENOUS at 09:09

## 2024-11-05 ASSESSMENT — PAIN DESCRIPTION - DESCRIPTORS: DESCRIPTORS: ACHING

## 2024-11-05 ASSESSMENT — PAIN SCALES - GENERAL
PAINLEVEL_OUTOF10: 0
PAINLEVEL_OUTOF10: 3
PAINLEVEL_OUTOF10: 3

## 2024-11-05 ASSESSMENT — PAIN DESCRIPTION - LOCATION: LOCATION: HEAD

## 2024-11-05 ASSESSMENT — PAIN - FUNCTIONAL ASSESSMENT: PAIN_FUNCTIONAL_ASSESSMENT: PREVENTS OR INTERFERES SOME ACTIVE ACTIVITIES AND ADLS

## 2024-11-05 ASSESSMENT — PAIN DESCRIPTION - ORIENTATION: ORIENTATION: RIGHT;LEFT

## 2024-11-05 NOTE — PLAN OF CARE
Problem: Discharge Planning  Goal: Discharge to home or other facility with appropriate resources  10/20/2024 1029 by Shelbie Gregory RN  Outcome: Progressing  10/20/2024 0114 by Meryl Martin RN  Outcome: Progressing  Flowsheets (Taken 10/20/2024 0108)  Discharge to home or other facility with appropriate resources: Identify barriers to discharge with patient and caregiver     Problem: Risk for Elopement  Goal: Patient will not exit the unit/facility without proper excort  10/20/2024 1029 by Shelbie Gregory RN  Outcome: Progressing  10/20/2024 0114 by Meryl Martin RN  Outcome: Progressing     Problem: Skin/Tissue Integrity  Goal: Absence of new skin breakdown  Description: 1.  Monitor for areas of redness and/or skin breakdown  2.  Assess vascular access sites hourly  3.  Every 4-6 hours minimum:  Change oxygen saturation probe site  4.  Every 4-6 hours:  If on nasal continuous positive airway pressure, respiratory therapy assess nares and determine need for appliance change or resting period.  10/20/2024 1029 by Shelbie Gregory RN  Outcome: Progressing  10/20/2024 0114 by Meryl Martin RN  Outcome: Progressing     Problem: Safety - Adult  Goal: Free from fall injury  10/20/2024 1029 by Shelbie Gregory RN  Outcome: Progressing  10/20/2024 0114 by Meryl Martin RN  Outcome: Progressing     Problem: Metabolic/Fluid and Electrolytes - Adult  Goal: Electrolytes maintained within normal limits  10/20/2024 1029 by Shelbie Gregory RN  Outcome: Progressing  10/20/2024 0114 by Meryl Martin RN  Outcome: Progressing  Flowsheets (Taken 10/20/2024 0108)  Electrolytes maintained within normal limits:   Monitor labs and assess patient for signs and symptoms of electrolyte imbalances   Instruct patient on fluid and nutrition restrictions as appropriate   Fluid restriction as ordered  Goal: Hemodynamic stability and optimal renal function maintained  10/20/2024 1029 by Shelbie Gregory RN  Outcome: 
  Problem: Discharge Planning  Goal: Discharge to home or other facility with appropriate resources  10/20/2024 2009 by Spenser Macedo, RN  Outcome: Progressing  10/20/2024 1029 by Shelbie Gregory, RN  Outcome: Progressing     Problem: Risk for Elopement  Goal: Patient will not exit the unit/facility without proper excort  10/20/2024 2009 by Spenser Macedo, RN  Outcome: Progressing  10/20/2024 1029 by Shelbie Gregory, RN  Outcome: Progressing     Problem: Skin/Tissue Integrity  Goal: Absence of new skin breakdown  Description: 1.  Monitor for areas of redness and/or skin breakdown  2.  Assess vascular access sites hourly  3.  Every 4-6 hours minimum:  Change oxygen saturation probe site  4.  Every 4-6 hours:  If on nasal continuous positive airway pressure, respiratory therapy assess nares and determine need for appliance change or resting period.  10/20/2024 2009 by Spenser Macedo, RN  Outcome: Progressing  10/20/2024 1029 by Shelbie Gregory, RN  Outcome: Progressing     
  Problem: Discharge Planning  Goal: Discharge to home or other facility with appropriate resources  10/21/2024 0349 by Spenser Macedo, RN  Outcome: Progressing  10/20/2024 2009 by Spenser Macedo RN  Outcome: Progressing     Problem: Risk for Elopement  Goal: Patient will not exit the unit/facility without proper excort  10/21/2024 0349 by Spenser Macedo, RN  Outcome: Progressing  10/20/2024 2009 by Spenser Macedo RN  Outcome: Progressing     Problem: Skin/Tissue Integrity  Goal: Absence of new skin breakdown  Description: 1.  Monitor for areas of redness and/or skin breakdown  2.  Assess vascular access sites hourly  3.  Every 4-6 hours minimum:  Change oxygen saturation probe site  4.  Every 4-6 hours:  If on nasal continuous positive airway pressure, respiratory therapy assess nares and determine need for appliance change or resting period.  10/21/2024 0349 by Spenser Macedo, RN  Outcome: Progressing  10/20/2024 2009 by Spenser Macedo, RN  Outcome: Progressing     
  Problem: Discharge Planning  Goal: Discharge to home or other facility with appropriate resources  10/21/2024 1039 by Shelbie Gregory RN  Outcome: Progressing  10/21/2024 0349 by Spenser Macedo RN  Outcome: Progressing     Problem: Risk for Elopement  Goal: Patient will not exit the unit/facility without proper excort  10/21/2024 1039 by Shelbie Gregory RN  Outcome: Progressing  10/21/2024 0349 by Spenser Macedo RN  Outcome: Progressing     Problem: Skin/Tissue Integrity  Goal: Absence of new skin breakdown  Description: 1.  Monitor for areas of redness and/or skin breakdown  2.  Assess vascular access sites hourly  3.  Every 4-6 hours minimum:  Change oxygen saturation probe site  4.  Every 4-6 hours:  If on nasal continuous positive airway pressure, respiratory therapy assess nares and determine need for appliance change or resting period.  10/21/2024 1039 by Shelbie Gregory RN  Outcome: Progressing  10/21/2024 0349 by Spenser Macedo RN  Outcome: Progressing     Problem: Safety - Adult  Goal: Free from fall injury  Outcome: Progressing     Problem: Metabolic/Fluid and Electrolytes - Adult  Goal: Electrolytes maintained within normal limits  Outcome: Progressing  Goal: Hemodynamic stability and optimal renal function maintained  Outcome: Progressing  Goal: Glucose maintained within prescribed range  Outcome: Progressing     Problem: Nutrition Deficit:  Goal: Optimize nutritional status  Outcome: Progressing     
  Problem: Discharge Planning  Goal: Discharge to home or other facility with appropriate resources  10/21/2024 2326 by Spenser Macedo, RN  Outcome: Progressing  10/21/2024 1039 by Shelbie Gregory, RN  Outcome: Progressing     Problem: Risk for Elopement  Goal: Patient will not exit the unit/facility without proper excort  10/21/2024 2326 by Spenser Macedo, RN  Outcome: Progressing  10/21/2024 1039 by Shelbie Gregory, RN  Outcome: Progressing     Problem: Skin/Tissue Integrity  Goal: Absence of new skin breakdown  Description: 1.  Monitor for areas of redness and/or skin breakdown  2.  Assess vascular access sites hourly  3.  Every 4-6 hours minimum:  Change oxygen saturation probe site  4.  Every 4-6 hours:  If on nasal continuous positive airway pressure, respiratory therapy assess nares and determine need for appliance change or resting period.  10/21/2024 2326 by Spenser Macedo, RN  Outcome: Progressing  10/21/2024 1039 by Shelbie Gregory, RN  Outcome: Progressing     
  Problem: Discharge Planning  Goal: Discharge to home or other facility with appropriate resources  10/22/2024 0602 by Spenser Macedo RN  Outcome: Progressing  10/21/2024 2326 by Spenser Macedo RN  Outcome: Progressing     Problem: Risk for Elopement  Goal: Patient will not exit the unit/facility without proper excort  10/22/2024 0602 by Spenser Macedo RN  Outcome: Progressing  10/21/2024 2326 by Spenser Macedo RN  Outcome: Progressing     Problem: Skin/Tissue Integrity  Goal: Absence of new skin breakdown  Description: 1.  Monitor for areas of redness and/or skin breakdown  2.  Assess vascular access sites hourly  3.  Every 4-6 hours minimum:  Change oxygen saturation probe site  4.  Every 4-6 hours:  If on nasal continuous positive airway pressure, respiratory therapy assess nares and determine need for appliance change or resting period.  10/22/2024 0602 by Spenser Macedo, RN  Outcome: Progressing  10/21/2024 2326 by Spenser Macedo RN  Outcome: Progressing     
  Problem: Discharge Planning  Goal: Discharge to home or other facility with appropriate resources  10/24/2024 0830 by Cailin Webster RN  Outcome: Progressing  10/23/2024 2351 by Spenser Macedo RN  Outcome: Progressing     Problem: Risk for Elopement  Goal: Patient will not exit the unit/facility without proper excort  10/24/2024 0830 by Cailin Webster RN  Outcome: Progressing  Flowsheets (Taken 10/24/2024 0730)  Nursing Interventions for Elopement Risk: Assist with personal care needs such as toileting, eating, dressing, as needed to reduce the risk of wandering  10/23/2024 2351 by Spenser Macedo RN  Outcome: Progressing     Problem: Safety - Adult  Goal: Free from fall injury  Outcome: Progressing     
  Problem: Discharge Planning  Goal: Discharge to home or other facility with appropriate resources  10/24/2024 2132 by Elif Garcia RN  Outcome: Progressing  Flowsheets (Taken 10/24/2024 1947)  Discharge to home or other facility with appropriate resources:   Identify barriers to discharge with patient and caregiver   Arrange for needed discharge resources and transportation as appropriate  10/24/2024 0830 by Cailin Webster RN  Outcome: Progressing     Problem: Risk for Elopement  Goal: Patient will not exit the unit/facility without proper excort  10/24/2024 2132 by Elif Garcia RN  Outcome: Progressing  10/24/2024 0830 by Cailin Webster RN  Outcome: Progressing  Flowsheets (Taken 10/24/2024 0730)  Nursing Interventions for Elopement Risk: Assist with personal care needs such as toileting, eating, dressing, as needed to reduce the risk of wandering     Problem: Skin/Tissue Integrity  Goal: Absence of new skin breakdown  Description: 1.  Monitor for areas of redness and/or skin breakdown  2.  Assess vascular access sites hourly  3.  Every 4-6 hours minimum:  Change oxygen saturation probe site  4.  Every 4-6 hours:  If on nasal continuous positive airway pressure, respiratory therapy assess nares and determine need for appliance change or resting period.  Outcome: Progressing     Problem: Safety - Adult  Goal: Free from fall injury  10/24/2024 2132 by Elif Garcia RN  Outcome: Progressing  10/24/2024 0830 by Cailin Webster RN  Outcome: Progressing     Problem: Metabolic/Fluid and Electrolytes - Adult  Goal: Electrolytes maintained within normal limits  Outcome: Progressing  Flowsheets (Taken 10/24/2024 1947)  Electrolytes maintained within normal limits: Monitor labs and assess patient for signs and symptoms of electrolyte imbalances  Goal: Hemodynamic stability and optimal renal function maintained  Outcome: Progressing  Flowsheets (Taken 10/24/2024 1947)  Hemodynamic stability and 
  Problem: Discharge Planning  Goal: Discharge to home or other facility with appropriate resources  10/25/2024 0828 by Cailin Webster RN  Outcome: Progressing  10/24/2024 2132 by Elif Garcia RN  Outcome: Progressing  Flowsheets (Taken 10/24/2024 1947)  Discharge to home or other facility with appropriate resources:   Identify barriers to discharge with patient and caregiver   Arrange for needed discharge resources and transportation as appropriate     Problem: Risk for Elopement  Goal: Patient will not exit the unit/facility without proper excort  10/25/2024 0828 by Cailin Webster RN  Outcome: Progressing  Flowsheets (Taken 10/25/2024 0715)  Nursing Interventions for Elopement Risk: Assist with personal care needs such as toileting, eating, dressing, as needed to reduce the risk of wandering  10/24/2024 2132 by Elif Garcia RN  Outcome: Progressing     Problem: Metabolic/Fluid and Electrolytes - Adult  Goal: Glucose maintained within prescribed range  10/25/2024 0828 by Cailin Webster RN  Outcome: Progressing  10/24/2024 2132 by Elif Garcia RN  Outcome: Progressing  Flowsheets (Taken 10/24/2024 1947)  Glucose maintained within prescribed range:   Monitor blood glucose as ordered   Assess for signs and symptoms of hyperglycemia and hypoglycemia     Problem: Neurosensory - Adult  Goal: Achieves stable or improved neurological status  Outcome: Progressing  Flowsheets (Taken 10/25/2024 0715)  Achieves stable or improved neurological status: Assess for and report changes in neurological status     
  Problem: Discharge Planning  Goal: Discharge to home or other facility with appropriate resources  10/28/2024 0941 by Peace Goldman RN  Outcome: Progressing  10/28/2024 0941 by Peace Goldman RN  Outcome: Progressing     Problem: Risk for Elopement  Goal: Patient will not exit the unit/facility without proper excort  10/28/2024 0941 by Peace Goldman RN  Outcome: Progressing  10/28/2024 0941 by Peace Goldman RN  Outcome: Progressing     Problem: Skin/Tissue Integrity  Goal: Absence of new skin breakdown  Description: 1.  Monitor for areas of redness and/or skin breakdown  2.  Assess vascular access sites hourly  3.  Every 4-6 hours minimum:  Change oxygen saturation probe site  4.  Every 4-6 hours:  If on nasal continuous positive airway pressure, respiratory therapy assess nares and determine need for appliance change or resting period.  10/28/2024 0941 by Peace Goldman RN  Outcome: Progressing  10/28/2024 0941 by Peace Goldman RN  Outcome: Progressing     
  Problem: Discharge Planning  Goal: Discharge to home or other facility with appropriate resources  11/5/2024 0133 by Brandt Kinsey RN  Outcome: Progressing  11/5/2024 0133 by Brandt Kinsey RN  Outcome: Progressing     Problem: Risk for Elopement  Goal: Patient will not exit the unit/facility without proper excort  11/5/2024 0133 by Brandt Kinsey RN  Outcome: Progressing  11/5/2024 0133 by Brandt Kinsey RN  Outcome: Progressing  Flowsheets (Taken 11/4/2024 2022)  Nursing Interventions for Elopement Risk: Assist with personal care needs such as toileting, eating, dressing, as needed to reduce the risk of wandering     Problem: Occupational Therapy - Adult  Goal: By Discharge: Performs self-care activities at highest level of function for planned discharge setting.  See evaluation for individualized goals.  Description: FUNCTIONAL STATUS PRIOR TO ADMISSION:  Pt unable to provide PLOF. Per chart, lives alone ( from wife). Unsure of ADL level, he reports he does not use AD and is a .   , ADL Assistance: Independent,  ,  ,  ,  ,  ,  , Ambulation Assistance: Independent, Transfer Assistance: Independent, Active : Yes     Occupational Therapy Goals:  Initiated 10/22/2024; Reviewed 10/28 for weekly reassesment, one goal met, continue all goals; goals reviewed and updated 11-4-24  1.  Patient will perform self-feeding with Set-up within 7 day(s). met  2.  Patient will perform grooming seated unsupported with Supervision within 7 day(s).; MET 10/28 upgrade to standing with supervision met upgrade to mod I  3.  Patient will perform bathing seated with Supervision within 7 day(s). cont  4.  Patient will perform toilet transfers with Contact Guard Assist  within 7 day(s). Met and upgrade to mod I ; cont  5.  Patient will perform all aspects of toileting with Contact Guard Assist within 7 day(s). Met and upgrade to mod I  met  6.  Patient will participate in upper extremity 
  Problem: Discharge Planning  Goal: Discharge to home or other facility with appropriate resources  Outcome: Progressing     Problem: Risk for Elopement  Goal: Patient will not exit the unit/facility without proper excort  10/23/2024 2351 by Spenser Macedo RN  Outcome: Progressing  10/23/2024 1308 by Cailin Webster RN  Outcome: Not Progressing  Flowsheets (Taken 10/23/2024 0725)  Nursing Interventions for Elopement Risk: Assist with personal care needs such as toileting, eating, dressing, as needed to reduce the risk of wandering     Problem: Skin/Tissue Integrity  Goal: Absence of new skin breakdown  Description: 1.  Monitor for areas of redness and/or skin breakdown  2.  Assess vascular access sites hourly  3.  Every 4-6 hours minimum:  Change oxygen saturation probe site  4.  Every 4-6 hours:  If on nasal continuous positive airway pressure, respiratory therapy assess nares and determine need for appliance change or resting period.  Outcome: Progressing     Problem: Risk for Elopement  Goal: Patient will not exit the unit/facility without proper excort  10/23/2024 2351 by Spenser Macedo RN  Outcome: Progressing  10/23/2024 1308 by Cailin Webster RN  Outcome: Not Progressing  Flowsheets (Taken 10/23/2024 0725)  Nursing Interventions for Elopement Risk: Assist with personal care needs such as toileting, eating, dressing, as needed to reduce the risk of wandering     
  Problem: Discharge Planning  Goal: Discharge to home or other facility with appropriate resources  Outcome: Progressing     Problem: Risk for Elopement  Goal: Patient will not exit the unit/facility without proper excort  Outcome: Progressing     Problem: Skin/Tissue Integrity  Goal: Absence of new skin breakdown  Description: 1.  Monitor for areas of redness and/or skin breakdown  2.  Assess vascular access sites hourly  3.  Every 4-6 hours minimum:  Change oxygen saturation probe site  4.  Every 4-6 hours:  If on nasal continuous positive airway pressure, respiratory therapy assess nares and determine need for appliance change or resting period.  Outcome: Progressing     
  Problem: Discharge Planning  Goal: Discharge to home or other facility with appropriate resources  Outcome: Progressing     Problem: Risk for Elopement  Goal: Patient will not exit the unit/facility without proper excort  Outcome: Progressing     Problem: Skin/Tissue Integrity  Goal: Absence of new skin breakdown  Description: 1.  Monitor for areas of redness and/or skin breakdown  2.  Assess vascular access sites hourly  3.  Every 4-6 hours minimum:  Change oxygen saturation probe site  4.  Every 4-6 hours:  If on nasal continuous positive airway pressure, respiratory therapy assess nares and determine need for appliance change or resting period.  Outcome: Progressing     Problem: Metabolic/Fluid and Electrolytes - Adult  Goal: Electrolytes maintained within normal limits  Outcome: Progressing     Problem: Neurosensory - Adult  Goal: Achieves stable or improved neurological status  Outcome: Progressing     Problem: Hematologic - Adult  Goal: Maintains hematologic stability  Outcome: Progressing     
  Problem: Discharge Planning  Goal: Discharge to home or other facility with appropriate resources  Outcome: Progressing     Problem: Risk for Elopement  Goal: Patient will not exit the unit/facility without proper excort  Outcome: Progressing     Problem: Skin/Tissue Integrity  Goal: Absence of new skin breakdown  Description: 1.  Monitor for areas of redness and/or skin breakdown  2.  Assess vascular access sites hourly  3.  Every 4-6 hours minimum:  Change oxygen saturation probe site  4.  Every 4-6 hours:  If on nasal continuous positive airway pressure, respiratory therapy assess nares and determine need for appliance change or resting period.  Outcome: Progressing     Problem: Safety - Adult  Goal: Free from fall injury  10/30/2024 2150 by Kathrin Nath, RN  Outcome: Progressing  10/30/2024 0912 by Papito Thompson, RN  Outcome: Progressing     
  Problem: Discharge Planning  Goal: Discharge to home or other facility with appropriate resources  Outcome: Progressing     Problem: Risk for Elopement  Goal: Patient will not exit the unit/facility without proper excort  Outcome: Progressing     Problem: Skin/Tissue Integrity  Goal: Absence of new skin breakdown  Description: 1.  Monitor for areas of redness and/or skin breakdown  2.  Assess vascular access sites hourly  3.  Every 4-6 hours minimum:  Change oxygen saturation probe site  4.  Every 4-6 hours:  If on nasal continuous positive airway pressure, respiratory therapy assess nares and determine need for appliance change or resting period.  Outcome: Progressing     Problem: Safety - Adult  Goal: Free from fall injury  Outcome: Progressing     Problem: Metabolic/Fluid and Electrolytes - Adult  Goal: Electrolytes maintained within normal limits  Outcome: Progressing  Goal: Hemodynamic stability and optimal renal function maintained  Outcome: Progressing  Goal: Glucose maintained within prescribed range  Outcome: Progressing     Problem: Nutrition Deficit:  Goal: Optimize nutritional status  Outcome: Progressing     
  Problem: Discharge Planning  Goal: Discharge to home or other facility with appropriate resources  Outcome: Progressing     Problem: Risk for Elopement  Goal: Patient will not exit the unit/facility without proper excort  Outcome: Progressing     Problem: Skin/Tissue Integrity  Goal: Absence of new skin breakdown  Description: 1.  Monitor for areas of redness and/or skin breakdown  2.  Assess vascular access sites hourly  3.  Every 4-6 hours minimum:  Change oxygen saturation probe site  4.  Every 4-6 hours:  If on nasal continuous positive airway pressure, respiratory therapy assess nares and determine need for appliance change or resting period.  Outcome: Progressing     Problem: Safety - Adult  Goal: Free from fall injury  Outcome: Progressing     Problem: Metabolic/Fluid and Electrolytes - Adult  Goal: Electrolytes maintained within normal limits  Outcome: Progressing  Goal: Hemodynamic stability and optimal renal function maintained  Outcome: Progressing  Goal: Glucose maintained within prescribed range  Outcome: Progressing     Problem: Nutrition Deficit:  Goal: Optimize nutritional status  Outcome: Progressing     Problem: Neurosensory - Adult  Goal: Achieves stable or improved neurological status  Outcome: Progressing  Goal: Achieves maximal functionality and self care  Outcome: Progressing     Problem: Hematologic - Adult  Goal: Maintains hematologic stability  Outcome: Progressing     Problem: Respiratory - Adult  Goal: Achieves optimal ventilation and oxygenation  Outcome: Progressing     Problem: Skin/Tissue Integrity - Adult  Goal: Skin integrity remains intact  Outcome: Progressing  Flowsheets (Taken 10/29/2024 2041)  Skin Integrity Remains Intact: Monitor for areas of redness and/or skin breakdown  Goal: Incisions, wounds, or drain sites healing without S/S of infection  Outcome: Progressing  Flowsheets (Taken 10/29/2024 2041)  Incisions, Wounds, or Drain Sites Healing Without Sign and Symptoms of 
  Problem: Discharge Planning  Goal: Discharge to home or other facility with appropriate resources  Outcome: Progressing     Problem: Risk for Elopement  Goal: Patient will not exit the unit/facility without proper excort  Outcome: Progressing     Problem: Skin/Tissue Integrity  Goal: Absence of new skin breakdown  Description: 1.  Monitor for areas of redness and/or skin breakdown  2.  Assess vascular access sites hourly  3.  Every 4-6 hours minimum:  Change oxygen saturation probe site  4.  Every 4-6 hours:  If on nasal continuous positive airway pressure, respiratory therapy assess nares and determine need for appliance change or resting period.  Outcome: Progressing     Problem: Safety - Adult  Goal: Free from fall injury  Outcome: Progressing     Problem: Metabolic/Fluid and Electrolytes - Adult  Goal: Electrolytes maintained within normal limits  Outcome: Progressing  Goal: Hemodynamic stability and optimal renal function maintained  Outcome: Progressing  Goal: Glucose maintained within prescribed range  Outcome: Progressing     Problem: Nutrition Deficit:  Goal: Optimize nutritional status  Outcome: Progressing     Problem: Neurosensory - Adult  Goal: Achieves stable or improved neurological status  Outcome: Progressing  Goal: Achieves maximal functionality and self care  Outcome: Progressing     Problem: Hematologic - Adult  Goal: Maintains hematologic stability  Outcome: Progressing     Problem: Respiratory - Adult  Goal: Achieves optimal ventilation and oxygenation  Outcome: Progressing     Problem: Skin/Tissue Integrity - Adult  Goal: Skin integrity remains intact  Outcome: Progressing  Goal: Incisions, wounds, or drain sites healing without S/S of infection  Outcome: Progressing  Goal: Oral mucous membranes remain intact  Outcome: Progressing     Problem: Musculoskeletal - Adult  Goal: Return mobility to safest level of function  Outcome: Progressing  Goal: Maintain proper alignment of affected body 
  Problem: Discharge Planning  Goal: Discharge to home or other facility with appropriate resources  Outcome: Progressing     Problem: Risk for Elopement  Goal: Patient will not exit the unit/facility without proper excort  Outcome: Progressing     Problem: Skin/Tissue Integrity  Goal: Absence of new skin breakdown  Description: 1.  Monitor for areas of redness and/or skin breakdown  2.  Assess vascular access sites hourly  3.  Every 4-6 hours minimum:  Change oxygen saturation probe site  4.  Every 4-6 hours:  If on nasal continuous positive airway pressure, respiratory therapy assess nares and determine need for appliance change or resting period.  Outcome: Progressing     Problem: Safety - Adult  Goal: Free from fall injury  Outcome: Progressing     Problem: Metabolic/Fluid and Electrolytes - Adult  Goal: Electrolytes maintained within normal limits  Outcome: Progressing  Goal: Hemodynamic stability and optimal renal function maintained  Outcome: Progressing  Goal: Glucose maintained within prescribed range  Outcome: Progressing     Problem: Nutrition Deficit:  Goal: Optimize nutritional status  Outcome: Progressing     Problem: Neurosensory - Adult  Goal: Achieves stable or improved neurological status  Outcome: Progressing  Goal: Achieves maximal functionality and self care  Outcome: Progressing     Problem: Hematologic - Adult  Goal: Maintains hematologic stability  Outcome: Progressing     Problem: Respiratory - Adult  Goal: Achieves optimal ventilation and oxygenation  Outcome: Progressing     Problem: Skin/Tissue Integrity - Adult  Goal: Skin integrity remains intact  Outcome: Progressing  Goal: Incisions, wounds, or drain sites healing without S/S of infection  Outcome: Progressing  Goal: Oral mucous membranes remain intact  Outcome: Progressing     Problem: Musculoskeletal - Adult  Goal: Return mobility to safest level of function  Outcome: Progressing  Goal: Maintain proper alignment of affected body 
  Problem: Discharge Planning  Goal: Discharge to home or other facility with appropriate resources  Outcome: Progressing     Problem: Risk for Elopement  Goal: Patient will not exit the unit/facility without proper excort  Outcome: Progressing     Problem: Skin/Tissue Integrity  Goal: Absence of new skin breakdown  Description: 1.  Monitor for areas of redness and/or skin breakdown  2.  Assess vascular access sites hourly  3.  Every 4-6 hours minimum:  Change oxygen saturation probe site  4.  Every 4-6 hours:  If on nasal continuous positive airway pressure, respiratory therapy assess nares and determine need for appliance change or resting period.  Outcome: Progressing     Problem: Safety - Adult  Goal: Free from fall injury  Outcome: Progressing     Problem: Metabolic/Fluid and Electrolytes - Adult  Goal: Electrolytes maintained within normal limits  Outcome: Progressing  Goal: Hemodynamic stability and optimal renal function maintained  Outcome: Progressing  Goal: Glucose maintained within prescribed range  Outcome: Progressing     Problem: Nutrition Deficit:  Goal: Optimize nutritional status  Outcome: Progressing     Problem: Occupational Therapy - Adult  Goal: By Discharge: Performs self-care activities at highest level of function for planned discharge setting.  See evaluation for individualized goals.  Description: FUNCTIONAL STATUS PRIOR TO ADMISSION:  Pt unable to provide PLOF. Per chart, lives alone ( from wife). Unsure of ADL level, he reports he does not use AD and is a .   , ADL Assistance: Independent,  ,  ,  ,  ,  ,  , Ambulation Assistance: Independent, Transfer Assistance: Independent, Active : Yes     Occupational Therapy Goals:  Initiated 10/22/2024; Reviewed 10/28 for weekly reassesment, one goal met, continue all goals  1.  Patient will perform self-feeding with Set-up within 7 day(s). met  2.  Patient will perform grooming seated unsupported with Supervision within 
  Problem: Discharge Planning  Goal: Discharge to home or other facility with appropriate resources  Outcome: Progressing  Flowsheets (Taken 10/18/2024 2000)  Discharge to home or other facility with appropriate resources:   Identify barriers to discharge with patient and caregiver   Arrange for needed discharge resources and transportation as appropriate   Identify discharge learning needs (meds, wound care, etc)   Refer to discharge planning if patient needs post-hospital services based on physician order or complex needs related to functional status, cognitive ability or social support system   Arrange for interpreters to assist at discharge as needed     Problem: Safety - Adult  Goal: Free from fall injury  Outcome: Progressing  Flowsheets (Taken 10/18/2024 2000)  Free From Fall Injury:   Instruct family/caregiver on patient safety   Based on caregiver fall risk screen, instruct family/caregiver to ask for assistance with transferring infant if caregiver noted to have fall risk factors     
  Problem: Discharge Planning  Goal: Discharge to home or other facility with appropriate resources  Outcome: Progressing  Flowsheets (Taken 10/29/2024 0830)  Discharge to home or other facility with appropriate resources: Identify barriers to discharge with patient and caregiver     Problem: Risk for Elopement  Goal: Patient will not exit the unit/facility without proper excort  Outcome: Progressing     Problem: Skin/Tissue Integrity  Goal: Absence of new skin breakdown  Description: 1.  Monitor for areas of redness and/or skin breakdown  2.  Assess vascular access sites hourly  3.  Every 4-6 hours minimum:  Change oxygen saturation probe site  4.  Every 4-6 hours:  If on nasal continuous positive airway pressure, respiratory therapy assess nares and determine need for appliance change or resting period.  Outcome: Progressing     Problem: Safety - Adult  Goal: Free from fall injury  Outcome: Progressing     Problem: Metabolic/Fluid and Electrolytes - Adult  Goal: Electrolytes maintained within normal limits  Outcome: Progressing  Flowsheets (Taken 10/29/2024 0830)  Electrolytes maintained within normal limits: Monitor labs and assess patient for signs and symptoms of electrolyte imbalances  Goal: Hemodynamic stability and optimal renal function maintained  Outcome: Progressing  Flowsheets (Taken 10/29/2024 0830)  Hemodynamic stability and optimal renal function maintained: Monitor labs and assess for signs and symptoms of volume excess or deficit  Goal: Glucose maintained within prescribed range  Outcome: Progressing  Flowsheets (Taken 10/29/2024 0830)  Glucose maintained within prescribed range: Monitor blood glucose as ordered     Problem: Nutrition Deficit:  Goal: Optimize nutritional status  Outcome: Progressing     Problem: Neurosensory - Adult  Goal: Achieves stable or improved neurological status  Outcome: Progressing  Flowsheets (Taken 10/29/2024 0830)  Achieves stable or improved neurological status: 
  Problem: Discharge Planning  Goal: Discharge to home or other facility with appropriate resources  Outcome: Progressing  Flowsheets (Taken 11/4/2024 0853)  Discharge to home or other facility with appropriate resources:   Identify barriers to discharge with patient and caregiver   Arrange for needed discharge resources and transportation as appropriate   Identify discharge learning needs (meds, wound care, etc)     Problem: Risk for Elopement  Goal: Patient will not exit the unit/facility without proper excort  Outcome: Progressing  Flowsheets (Taken 11/4/2024 0853)  Nursing Interventions for Elopement Risk:   Collaborate with family members/caregivers to mitigate the elopement risk   Assist with personal care needs such as toileting, eating, dressing, as needed to reduce the risk of wandering   Collaborate with treatment team for drug withdrawal symptoms treatment   Collaborate with treatment team for nicotine replacement     Problem: Skin/Tissue Integrity  Goal: Absence of new skin breakdown  Description: 1.  Monitor for areas of redness and/or skin breakdown  2.  Assess vascular access sites hourly  3.  Every 4-6 hours minimum:  Change oxygen saturation probe site  4.  Every 4-6 hours:  If on nasal continuous positive airway pressure, respiratory therapy assess nares and determine need for appliance change or resting period.  Outcome: Progressing     Problem: Safety - Adult  Goal: Free from fall injury  Outcome: Progressing  Flowsheets (Taken 11/4/2024 0853)  Free From Fall Injury:   Instruct family/caregiver on patient safety   Based on caregiver fall risk screen, instruct family/caregiver to ask for assistance with transferring infant if caregiver noted to have fall risk factors     Problem: Metabolic/Fluid and Electrolytes - Adult  Goal: Electrolytes maintained within normal limits  Outcome: Progressing  Flowsheets (Taken 11/4/2024 0853)  Electrolytes maintained within normal limits:   Monitor labs and 
  Problem: Discharge Planning  Goal: Discharge to home or other facility with appropriate resources  Recent Flowsheet Documentation  Taken 10/22/2024 1901 by Spenser Macedo RN  Discharge to home or other facility with appropriate resources: Identify barriers to discharge with patient and caregiver     Problem: Risk for Elopement  Goal: Patient will not exit the unit/facility without proper excort  Outcome: Progressing     Problem: Safety - Adult  Goal: Free from fall injury  Outcome: Progressing     
  Problem: Occupational Therapy - Adult  Goal: By Discharge: Performs self-care activities at highest level of function for planned discharge setting.  See evaluation for individualized goals.  Description: FUNCTIONAL STATUS PRIOR TO ADMISSION:  Pt unable to provide PLOF. Per chart, lives alone ( from wife). Unsure of ADL level, he reports he does not use AD and is a .   , ADL Assistance: Independent,  ,  ,  ,  ,  ,  , Ambulation Assistance: Independent, Transfer Assistance: Independent, Active : Yes     Occupational Therapy Goals:  Initiated 10/22/2024  1.  Patient will perform self-feeding with Set-up within 7 day(s).  2.  Patient will perform grooming seated unsupported with Supervision within 7 day(s).  3.  Patient will perform bathing seated with Supervision within 7 day(s).  4.  Patient will perform toilet transfers with Contact Guard Assist  within 7 day(s).  5.  Patient will perform all aspects of toileting with Contact Guard Assist within 7 day(s).  6.  Patient will participate in upper extremity therapeutic exercise/activities with Supervision for 10 minutes within 7 day(s).    7.  Patient will utilize energy conservation techniques during functional activities with verbal cues within 7 day(s).    Outcome: Progressing   OCCUPATIONAL THERAPY EVALUATION    Patient: Getachew Durham (64 y.o. male)  Date: 10/22/2024  Primary Diagnosis: Fall, initial encounter [W19.XXXA]  Alcohol withdrawal seizure with delirium (HCC) [F10.931, R56.9]  Acute renal failure, unspecified acute renal failure type (HCC) [N17.9]  Altered mental status, unspecified altered mental status type [R41.82]  Alcohol use disorder [F10.90]  Procedure(s) (LRB):  ESOPHAGOGASTRODUODENOSCOPY (N/A) 4 Days Post-Op     Precautions: Fall Risk, Bed Alarm                  ASSESSMENT :  The patient is limited by decreased functional mobility, independence in ADLs, high-level IADLs, ROM, strength, activity tolerance, safety 
  Problem: Occupational Therapy - Adult  Goal: By Discharge: Performs self-care activities at highest level of function for planned discharge setting.  See evaluation for individualized goals.  Description: FUNCTIONAL STATUS PRIOR TO ADMISSION:  Pt unable to provide PLOF. Per chart, lives alone ( from wife). Unsure of ADL level, he reports he does not use AD and is a .   , ADL Assistance: Independent,  ,  ,  ,  ,  ,  , Ambulation Assistance: Independent, Transfer Assistance: Independent, Active : Yes     Occupational Therapy Goals:  Initiated 10/22/2024  1.  Patient will perform self-feeding with Set-up within 7 day(s).  2.  Patient will perform grooming seated unsupported with Supervision within 7 day(s).  3.  Patient will perform bathing seated with Supervision within 7 day(s).  4.  Patient will perform toilet transfers with Contact Guard Assist  within 7 day(s).  5.  Patient will perform all aspects of toileting with Contact Guard Assist within 7 day(s).  6.  Patient will participate in upper extremity therapeutic exercise/activities with Supervision for 10 minutes within 7 day(s).    7.  Patient will utilize energy conservation techniques during functional activities with verbal cues within 7 day(s).    Outcome: Progressing   OCCUPATIONAL THERAPY TREATMENT  Patient: Getachew Durham (64 y.o. male)  Date: 10/24/2024  Primary Diagnosis: Fall, initial encounter [W19.XXXA]  Alcohol withdrawal seizure with delirium (HCC) [F10.931, R56.9]  Acute renal failure, unspecified acute renal failure type (HCC) [N17.9]  Altered mental status, unspecified altered mental status type [R41.82]  Alcohol use disorder [F10.90]  Procedure(s) (LRB):  ESOPHAGOGASTRODUODENOSCOPY (N/A) 6 Days Post-Op   Precautions: Fall Risk, Bed Alarm                Chart, occupational therapy assessment, plan of care, and goals were reviewed.    ASSESSMENT  Patient continues to benefit from skilled OT services and is progressing 
  Problem: Occupational Therapy - Adult  Goal: By Discharge: Performs self-care activities at highest level of function for planned discharge setting.  See evaluation for individualized goals.  Description: FUNCTIONAL STATUS PRIOR TO ADMISSION:  Pt unable to provide PLOF. Per chart, lives alone ( from wife). Unsure of ADL level, he reports he does not use AD and is a .   , ADL Assistance: Independent,  ,  ,  ,  ,  ,  , Ambulation Assistance: Independent, Transfer Assistance: Independent, Active : Yes     Occupational Therapy Goals:  Initiated 10/22/2024  1.  Patient will perform self-feeding with Set-up within 7 day(s).  2.  Patient will perform grooming seated unsupported with Supervision within 7 day(s).  3.  Patient will perform bathing seated with Supervision within 7 day(s).  4.  Patient will perform toilet transfers with Contact Guard Assist  within 7 day(s).  5.  Patient will perform all aspects of toileting with Contact Guard Assist within 7 day(s).  6.  Patient will participate in upper extremity therapeutic exercise/activities with Supervision for 10 minutes within 7 day(s).    7.  Patient will utilize energy conservation techniques during functional activities with verbal cues within 7 day(s).    Outcome: Progressing   OCCUPATIONAL THERAPY TREATMENT  Patient: Getachew Durham (64 y.o. male)  Date: 10/25/2024  Primary Diagnosis: Fall, initial encounter [W19.XXXA]  Alcohol withdrawal seizure with delirium (HCC) [F10.931, R56.9]  Acute renal failure, unspecified acute renal failure type (HCC) [N17.9]  Altered mental status, unspecified altered mental status type [R41.82]  Alcohol use disorder [F10.90]  Procedure(s) (LRB):  ESOPHAGOGASTRODUODENOSCOPY (N/A) 7 Days Post-Op   Precautions: Fall Risk, Bed Alarm                Chart, occupational therapy assessment, plan of care, and goals were reviewed.    ASSESSMENT  Patient continues to benefit from skilled OT services and is progressing 
  Problem: Occupational Therapy - Adult  Goal: By Discharge: Performs self-care activities at highest level of function for planned discharge setting.  See evaluation for individualized goals.  Description: FUNCTIONAL STATUS PRIOR TO ADMISSION:  Pt unable to provide PLOF. Per chart, lives alone ( from wife). Unsure of ADL level, he reports he does not use AD and is a .   , ADL Assistance: Independent,  ,  ,  ,  ,  ,  , Ambulation Assistance: Independent, Transfer Assistance: Independent, Active : Yes     Occupational Therapy Goals:  Initiated 10/22/2024; Reviewed 10/28 for weekly reassesment, one goal met, continue all goals  1.  Patient will perform self-feeding with Set-up within 7 day(s).  2.  Patient will perform grooming seated unsupported with Supervision within 7 day(s).; MET 10/28 upgrade to standing with supervision  3.  Patient will perform bathing seated with Supervision within 7 day(s).  4.  Patient will perform toilet transfers with Contact Guard Assist  within 7 day(s).  5.  Patient will perform all aspects of toileting with Contact Guard Assist within 7 day(s).  6.  Patient will participate in upper extremity therapeutic exercise/activities with Supervision for 10 minutes within 7 day(s).    7.  Patient will utilize energy conservation techniques during functional activities with verbal cues within 7 day(s).    Outcome: Progressing  OCCUPATIONAL THERAPY TREATMENT: WEEKLY REASSESSMENT    Patient: Getachew Durham (64 y.o. male)  Date: 10/28/2024  Primary Diagnosis: Fall, initial encounter [W19.XXXA]  Alcohol withdrawal seizure with delirium (HCC) [F10.931, R56.9]  Acute renal failure, unspecified acute renal failure type (HCC) [N17.9]  Altered mental status, unspecified altered mental status type [R41.82]  Alcohol use disorder [F10.90]  Procedure(s) (LRB):  ESOPHAGOGASTRODUODENOSCOPY (N/A) 10 Days Post-Op   Precautions: Fall Risk, Bed Alarm                Chart, occupational 
  Problem: Physical Therapy - Adult  Goal: By Discharge: Performs mobility at highest level of function for planned discharge setting.  See evaluation for individualized goals.  Description: FUNCTIONAL STATUS PRIOR TO ADMISSION: Patient is a poor historian and confused, states he lives with wife but CM note indicates they are .  Ambulatory and works as  (?)    HOME SUPPORT PRIOR TO ADMISSION: The patient lived alone, appears he was indep with ADLs..    Physical Therapy Goals  Initiated 10/22/2024  1.  Patient will move from supine to sit and sit to supine in bed with supervision/set-up within 7 day(s).    2.  Patient will perform sit to stand with contact guard assist within 7 day(s).  3.  Patient will transfer from bed to chair and chair to bed with contact guard assist using the least restrictive device within 7 day(s).  4.  Patient will ambulate with contact guard assist for 150 feet with the least restrictive device within 7 day(s).   Outcome: Progressing   PHYSICAL THERAPY TREATMENT    Patient: Getachew Durham (64 y.o. male)  Date: 10/24/2024  Diagnosis: Fall, initial encounter [W19.XXXA]  Alcohol withdrawal seizure with delirium (HCC) [F10.931, R56.9]  Acute renal failure, unspecified acute renal failure type (HCC) [N17.9]  Altered mental status, unspecified altered mental status type [R41.82]  Alcohol use disorder [F10.90] Alcohol withdrawal seizure with delirium (HCC)  Procedure(s) (LRB):  ESOPHAGOGASTRODUODENOSCOPY (N/A) 6 Days Post-Op  Precautions: Fall Risk, Bed Alarm                      ASSESSMENT:  Patient continues to benefit from skilled PT services and is progressing towards goals. Patient overall limited by continued confusion (but is improved), hypotension, impaired balance, gait instability and decreased activity tolerance.  Currently patient requires supervision for bed mobility and SBA for transfers.  BP is stable with supine to sit transfers and does not report any 
  Problem: Physical Therapy - Adult  Goal: By Discharge: Performs mobility at highest level of function for planned discharge setting.  See evaluation for individualized goals.  Description: FUNCTIONAL STATUS PRIOR TO ADMISSION: Patient is a poor historian and confused, states he lives with wife but CM note indicates they are .  Ambulatory and works as  (?)    HOME SUPPORT PRIOR TO ADMISSION: The patient lived alone, appears he was indep with ADLs..    Physical Therapy Goals  Initiated 10/22/2024  1.  Patient will move from supine to sit and sit to supine in bed with supervision/set-up within 7 day(s).    2.  Patient will perform sit to stand with contact guard assist within 7 day(s).  3.  Patient will transfer from bed to chair and chair to bed with contact guard assist using the least restrictive device within 7 day(s).  4.  Patient will ambulate with contact guard assist for 150 feet with the least restrictive device within 7 day(s).   Outcome: Progressing   PHYSICAL THERAPY TREATMENT    Patient: Getachew Durham (64 y.o. male)  Date: 10/25/2024  Diagnosis: Fall, initial encounter [W19.XXXA]  Alcohol withdrawal seizure with delirium (HCC) [F10.931, R56.9]  Acute renal failure, unspecified acute renal failure type (HCC) [N17.9]  Altered mental status, unspecified altered mental status type [R41.82]  Alcohol use disorder [F10.90] Alcohol withdrawal seizure with delirium (HCC)  Procedure(s) (LRB):  ESOPHAGOGASTRODUODENOSCOPY (N/A) 7 Days Post-Op  Precautions: Fall Risk, Bed Alarm                      ASSESSMENT:  Patient continues to benefit from skilled PT services and is progressing towards goals. Patient continues to be very hypotensive with activity and HR as high as 130 bpm with minimal activity.  Overall supervision for bed mobility and supervision for transfers.  Amb approx 20 feet with RW and CGA.  No overt LOB but continues to be confused.           PLAN:  Patient continues to benefit 
  Problem: Physical Therapy - Adult  Goal: By Discharge: Performs mobility at highest level of function for planned discharge setting.  See evaluation for individualized goals.  Description: FUNCTIONAL STATUS PRIOR TO ADMISSION: Patient is a poor historian and confused, states he lives with wife but CM note indicates they are .  Ambulatory and works as  (?)    HOME SUPPORT PRIOR TO ADMISSION: The patient lived alone, appears he was indep with ADLs..    Physical Therapy Goals  Re-Assessment 10/28/24  1.  Patient will move from supine to sit and sit to supine in bed with supervision/set-up within 7 day(s).    2.  Patient will perform sit to stand with supervision within 7 day(s).  3.  Patient will transfer from bed to chair and chair to bed with supervision using the least restrictive device within 7 day(s).  4.  Patient will ambulate with contact guard assist for 150 feet with the least restrictive device within 7 day(s).     Initiated 10/22/2024  1.  Patient will move from supine to sit and sit to supine in bed with supervision/set-up within 7 day(s).    2.  Patient will perform sit to stand with contact guard assist within 7 day(s).  3.  Patient will transfer from bed to chair and chair to bed with contact guard assist using the least restrictive device within 7 day(s).  4.  Patient will ambulate with contact guard assist for 150 feet with the least restrictive device within 7 day(s).   PHYSICAL THERAPY TREATMENT: WEEKLY REASSESSMENT    Outcome: Not Progressing    PHYSICAL THERAPY TREATMENT    Patient: Getachew Durham (64 y.o. male)  Date: 10/31/2024  Diagnosis: Fall, initial encounter [W19.XXXA]  Alcohol withdrawal seizure with delirium (HCC) [F10.931, R56.9]  Acute renal failure, unspecified acute renal failure type (HCC) [N17.9]  Altered mental status, unspecified altered mental status type [R41.82]  Alcohol use disorder [F10.90] Alcohol withdrawal seizure with delirium (HCC)  Procedure(s) 
  Problem: Physical Therapy - Adult  Goal: By Discharge: Performs mobility at highest level of function for planned discharge setting.  See evaluation for individualized goals.  Description: FUNCTIONAL STATUS PRIOR TO ADMISSION: Patient is a poor historian and confused, states he lives with wife but CM note indicates they are .  Ambulatory and works as  (?)    HOME SUPPORT PRIOR TO ADMISSION: The patient lived alone, appears he was indep with ADLs..    Physical Therapy Goals  Re-Assessment 10/28/24: reassessed 11/4/24 - current goals still appropriate.  1.  Patient will move from supine to sit and sit to supine in bed with supervision/set-up within 7 day(s).    2.  Patient will perform sit to stand with supervision within 7 day(s).  3.  Patient will transfer from bed to chair and chair to bed with supervision using the least restrictive device within 7 day(s).  4.  Patient will ambulate with contact guard assist for 150 feet with the least restrictive device within 7 day(s).     Initiated 10/22/2024  1.  Patient will move from supine to sit and sit to supine in bed with supervision/set-up within 7 day(s).    2.  Patient will perform sit to stand with contact guard assist within 7 day(s).  3.  Patient will transfer from bed to chair and chair to bed with contact guard assist using the least restrictive device within 7 day(s).  4.  Patient will ambulate with contact guard assist for 150 feet with the least restrictive device within 7 day(s).     Outcome: Progressing  PHYSICAL THERAPY TREATMENT: WEEKLY REASSESSMENT    Patient: Getachew Durham (64 y.o. male)  Date: 11/4/2024  Primary Diagnosis: Fall, initial encounter [W19.XXXA]  Alcohol withdrawal seizure with delirium (HCC) [F10.931, R56.9]  Acute renal failure, unspecified acute renal failure type (HCC) [N17.9]  Altered mental status, unspecified altered mental status type [R41.82]  Alcohol use disorder [F10.90]  Procedure(s) 
  Problem: Risk for Elopement  Goal: Patient will not exit the unit/facility without proper excort  10/23/2024 1308 by Cailin Webster, RN  Outcome: Not Progressing  Flowsheets (Taken 10/23/2024 2579)  Nursing Interventions for Elopement Risk: Assist with personal care needs such as toileting, eating, dressing, as needed to reduce the risk of wandering  10/23/2024 0231 by Spenser Macedo, RN  Outcome: Progressing     
  Problem: Safety - Adult  Goal: Free from fall injury  Outcome: Progressing     Problem: Risk for Elopement  Goal: Patient will not exit the unit/facility without proper excort  11/3/2024 1018 by Aravind Lovelace RN  Outcome: Progressing  11/3/2024 0313 by Brandt Kinsey RN  Outcome: Progressing     Problem: Discharge Planning  Goal: Discharge to home or other facility with appropriate resources  11/3/2024 1018 by Aravind Lovelace RN  Outcome: Progressing  11/3/2024 0313 by Brandt Kinsey RN  Outcome: Progressing     
Predictive Model Details          43 (Caution)  Factor Value    Calculated 10/22/2024 07:46 24% Age 64 years old    Deterioration Index Model 21% Neurological exam X     15% [place] coma scale 14     12% Sodium 145 mmol/L     7% Pulse 104     7% Hematocrit abnormal (23.8 %)     5% Respiratory rate 18     4% WBC count 10.9 K/uL     2% Systolic 120     2% Potassium 3.5 mmol/L     1% Platelet count abnormal (113 K/uL)     0% Pulse oximetry 96 %     0% Temperature 98.5 °F (36.9 °C)        Problem: Discharge Planning  Goal: Discharge to home or other facility with appropriate resources  10/22/2024 0746 by Xuan Mark RN  Outcome: Progressing  10/22/2024 0602 by Spenser Macedo RN  Outcome: Progressing  10/21/2024 2326 by Spenser Macedo RN  Outcome: Progressing     Problem: Risk for Elopement  Goal: Patient will not exit the unit/facility without proper excort  10/22/2024 0746 by Xuan Mark RN  Outcome: Progressing  Flowsheets (Taken 10/22/2024 0746)  Nursing Interventions for Elopement Risk:   Assist with personal care needs such as toileting, eating, dressing, as needed to reduce the risk of wandering   Collaborate with family members/caregivers to mitigate the elopement risk   Collaborate with treatment team for drug withdrawal symptoms treatment   Communicate/escalate to charge nurse the risk of elopement   Make sure patient has all necessary personal care items   Reduce environmental triggers  10/22/2024 0602 by Spenser Macedo RN  Outcome: Progressing  10/21/2024 2326 by Spenser Macedo RN  Outcome: Progressing     Problem: Skin/Tissue Integrity  Goal: Absence of new skin breakdown  Description: 1.  Monitor for areas of redness and/or skin breakdown  2.  Assess vascular access sites hourly  3.  Every 4-6 hours minimum:  Change oxygen saturation probe site  4.  Every 4-6 hours:  If on nasal continuous positive airway pressure, respiratory therapy assess nares and determine need for appliance 
Progressing  Flowsheets (Taken 10/26/2024 1955)  Achieves stable or improved neurological status:   Assess for and report changes in neurological status   Initiate measures to prevent increased intracranial pressure  10/26/2024 1217 by Audrey Guy RN  Outcome: Progressing  Goal: Achieves maximal functionality and self care  10/27/2024 0144 by Elif Garcia RN  Outcome: Progressing  Flowsheets (Taken 10/26/2024 1955)  Achieves maximal functionality and self care:   Monitor swallowing and airway patency with patient fatigue and changes in neurological status   Encourage and assist patient to increase activity and self care with guidance from physical therapy/occupational therapy  10/26/2024 1217 by Audrey Guy RN  Outcome: Progressing     Problem: Hematologic - Adult  Goal: Maintains hematologic stability  10/27/2024 0144 by Elif Garcia RN  Outcome: Progressing  10/26/2024 1217 by Audrey Guy RN  Outcome: Progressing     
RN  Outcome: Progressing     Problem: Neurosensory - Adult  Goal: Achieves stable or improved neurological status  11/1/2024 1536 by Katharina Latif RN  Outcome: Progressing  11/1/2024 0229 by Anna Nelson RN  Outcome: Progressing  Goal: Achieves maximal functionality and self care  11/1/2024 1536 by Katharina Latif RN  Outcome: Progressing  11/1/2024 0229 by Anna Nelson RN  Outcome: Progressing     Problem: Hematologic - Adult  Goal: Maintains hematologic stability  11/1/2024 1536 by Katharina Latif RN  Outcome: Progressing  11/1/2024 0229 by Anna Nelson RN  Outcome: Progressing     Problem: Respiratory - Adult  Goal: Achieves optimal ventilation and oxygenation  11/1/2024 1536 by Katharina Latif RN  Outcome: Progressing  11/1/2024 0229 by Anna Nelson RN  Outcome: Progressing     Problem: Skin/Tissue Integrity - Adult  Goal: Skin integrity remains intact  11/1/2024 1536 by Katharina Latif RN  Outcome: Progressing  11/1/2024 0229 by Anna Nelson RN  Outcome: Progressing  Goal: Incisions, wounds, or drain sites healing without S/S of infection  11/1/2024 1536 by Katharina Latif RN  Outcome: Progressing  11/1/2024 0229 by Anna Nelson RN  Outcome: Progressing  Goal: Oral mucous membranes remain intact  11/1/2024 1536 by Katharina Latif RN  Outcome: Progressing  11/1/2024 0229 by Anna Nelson RN  Outcome: Progressing     Problem: Musculoskeletal - Adult  Goal: Return mobility to safest level of function  11/1/2024 1536 by Katharina Latif RN  Outcome: Progressing  11/1/2024 0229 by Anna Nelson RN  Outcome: Progressing  Goal: Maintain proper alignment of affected body part  11/1/2024 1536 by Katharina Latif RN  Outcome: Progressing  11/1/2024 0229 by Anna Nelson RN  Outcome: Progressing  Goal: Return ADL status to a safe level of function  11/1/2024 1536 by Katharina Latif, RN  Outcome: Progressing  11/1/2024 0229 by Omar 
GENESIS Ventura  Outcome: Progressing     Problem: Gastrointestinal - Adult  Goal: Minimal or absence of nausea and vomiting  10/27/2024 1622 by Audrey Guy RN  Outcome: Progressing  10/27/2024 1607 by Audrey Guy RN  Outcome: Progressing  Goal: Maintains or returns to baseline bowel function  10/27/2024 1622 by Audrey Guy RN  Outcome: Progressing  10/27/2024 1607 by Audrey Guy RN  Outcome: Progressing  Goal: Maintains adequate nutritional intake  10/27/2024 1622 by Audrey Guy RN  Outcome: Progressing  10/27/2024 1607 by Audrey Guy RN  Outcome: Progressing  Goal: Establish and maintain optimal ostomy function  10/27/2024 1622 by Audrey Guy RN  Outcome: Progressing  10/27/2024 1607 by Audrey Guy RN  Outcome: Progressing     Problem: Genitourinary - Adult  Goal: Absence of urinary retention  10/27/2024 1622 by Audrey Guy RN  Outcome: Progressing  10/27/2024 1607 by Audrey Guy RN  Outcome: Progressing  Goal: Urinary catheter remains patent  10/27/2024 1622 by Audrey Guy RN  Outcome: Progressing  10/27/2024 1607 by Audrey Guy RN  Outcome: Progressing     
Remains Intact: Monitor for areas of redness and/or skin breakdown  11/1/2024 1536 by Katharina Latif RN  Outcome: Progressing  Goal: Incisions, wounds, or drain sites healing without S/S of infection  11/2/2024 0045 by Anna Nelson RN  Outcome: Progressing  Flowsheets (Taken 11/1/2024 1947)  Incisions, Wounds, or Drain Sites Healing Without Sign and Symptoms of Infection: ADMISSION and DAILY: Assess and document risk factors for pressure ulcer development  11/1/2024 1536 by Katharina Latif RN  Outcome: Progressing  Goal: Oral mucous membranes remain intact  11/2/2024 0045 by Anna Nelson RN  Outcome: Progressing  Flowsheets (Taken 11/1/2024 1947)  Oral Mucous Membranes Remain Intact: Assess oral mucosa and hygiene practices  11/1/2024 1536 by Katharina Latif RN  Outcome: Progressing     Problem: Musculoskeletal - Adult  Goal: Return mobility to safest level of function  11/2/2024 0045 by Anna Nelson RN  Outcome: Progressing  Flowsheets (Taken 11/1/2024 1947)  Return Mobility to Safest Level of Function: Assess patient stability and activity tolerance for standing, transferring and ambulating with or without assistive devices  11/1/2024 1536 by Katharina Latif RN  Outcome: Progressing  Goal: Maintain proper alignment of affected body part  11/2/2024 0045 by Anna Nelson RN  Outcome: Progressing  11/1/2024 1536 by Katharina Latif RN  Outcome: Progressing  Goal: Return ADL status to a safe level of function  11/2/2024 0045 by Anna Nelson RN  Outcome: Progressing  11/1/2024 1536 by Katharina Latif RN  Outcome: Progressing     Problem: Gastrointestinal - Adult  Goal: Minimal or absence of nausea and vomiting  11/2/2024 0045 by Anna Nelson RN  Outcome: Progressing  11/1/2024 1536 by Katharina Latif RN  Outcome: Progressing  Goal: Maintains or returns to baseline bowel function  11/2/2024 0045 by Anna Nelson RN  Outcome: Progressing  11/1/2024 1536 by 
patient had two or more falls in the past year or any fall with injury in the past year?: Unknown  ADL Assistance: Independent  Ambulation Assistance: Independent  Transfer Assistance: Independent  Active : Yes  Mode of Transportation: Car  Occupation: Other(comment) (He refused to answer this question.  Wife still works he was not sure if he is on her insurance or not.  NO insurance listed.)    Cognitive/Behavioral Status:  Orientation  Overall Orientation Status: Impaired  Orientation Level: Oriented to person;Disoriented to situation;Disoriented to time;Disoriented to place  Cognition  Overall Cognitive Status: Exceptions  Arousal/Alertness: Delayed responses to stimuli  Following Commands: Impaired  Attention Span: Impaired  Memory: Impaired  Safety Judgement: Impaired  Problem Solving: Impaired  Insights: Not aware of deficits  Initiation: Requires cues for all  Sequencing: Requires cues for all         Vision/Perceptual:    Vision - Basic Assessment  Prior Vision: Wears glasses all the time        Perception  Overall Perceptual Status: WFL    Strength:    Strength: Generally decreased, functional    Tone & Sensation:   Tone: Normal       Coordination:  Coordination: Generally decreased, functional    Range Of Motion:  AROM: Generally decreased, functional       Functional Mobility:  Bed Mobility:     Bed Mobility Training  Bed Mobility Training: No  Transfers:     Transfer Training  Transfer Training: Yes  Sit to Stand: Moderate assistance;Assist X1  Stand to Sit: Minimum assistance;Assist X1  Toilet Transfer: Moderate assistance;Additional time;Adaptive equipment  Balance:               Balance  Sitting: Impaired  Sitting - Static: Fair (occasional)  Standing: Impaired;With support  Standing - Static: Fair;Constant support  Standing - Dynamic: Not tested  Ambulation/Gait Training:                       Gait  Gait Training: No                                                                                
Dressing Skilled Clinical Factors: note R ankle with pain and swelling 3 days ago; not improved; MD and RN notified    Toileting: Modified independent ;Stand by assistance              Functional Mobility: Modified independent ;Stand by assistance  Functional Mobility Skilled Clinical Factors: fall risk with 6-8/10 R ankle pain; using RW to off load R ankle which he does not use PTA; unable to safely walk without use of RW; has signed refusal to use chair alarm and is up ad sivan despite therapy recommendations for S due to R ankle deficits/safety risks      Pain Ratin/10 R ankle at rest; 8/10 R ankle w/AROM  Pain Intervention(s):   nursing notified and addressing, rest, elevation, and repositioning      Activity Tolerance:   Fair , requires rest breaks, and SpO2 stable on room air  Please refer to the flowsheet for vital signs taken during this treatment.    After treatment:   Patient left in no apparent distress sitting up in chair, Call bell within reach, Heels elevated for pressure relief, Updated patient's board on functional status and mobility recommendations, and has signed alarm refusal    COMMUNICATION/EDUCATION:   The patient's plan of care was discussed with: physical therapist, registered nurse, , and certified nursing assistant/patient care technician    Patient Education  Education Given To: Patient  Education Provided: Role of Therapy;Precautions;ADL Adaptive Strategies;Transfer Training;Equipment;Mobility Training;Plan of Care;Home Exercise Program;Orientation;Fall Prevention Strategies  Education Provided Comments: pain management; edema management  Education Method: Demonstration;Verbal;Teach Back  Barriers to Learning: Cognition  Education Outcome: Verbalized understanding;Continued education needed    Thank you for this referral.  Vanessa Jackson OTR/L  Minutes: 13   
understanding;Continued education needed    Thank you for this referral.  Beatriz Pulido, OT  Minutes: 28       
                                             Barthel Index:    Barthel Index Scale  Feeding: Independent, Able to apply any necessary device. Feeds in reasonable time  Bathing: Cannot perform activity  Grooming: Washes face, hernandez hair, brushes teeth, shaves (manages plug if electric razor)  Dressing: Needs help, but does at least half of task within reasonable time  Bowel Control: No accidents. Able to use enema or suppository if needed  Bladder Control: No accidents. Able to care for collecting device, if used  Toilet Transfers: Needs help for balance, handling clothes or toilet paper  Chair/Bed Trannsfers: Minimum assistance or supervision required  Ambulation: Cannot perform activity  Stairs: Cannot perform activity  Total Barthel Index Score: 55       The Barthel ADL Index: Guidelines  1. The index should be used as a record of what a patient does, not as a record of what a patient could do.  2. The main aim is to establish degree of independence from any help, physical or verbal, however minor and for whatever reason.  3. The need for supervision renders the patient not independent.  4. A patient's performance should be established using the best available evidence. Asking the patient, friends/relatives and nurses are the usual sources, but direct observation and common sense are also important. However direct testing is not needed.  5. Usually the patient's performance over the preceding 24-48 hours is important, but occasionally longer periods will be relevant.  6. Middle categories imply that the patient supplies over 50 per cent of the effort.  7. Use of aids to be independent is allowed.    Score Interpretation (from [place] 1997)    Independent   60-79 Minimally independent   40-59 Partially dependent   20-39 Very dependent   <20 Totally dependent     -Tahira Walter., Barthel, D.W. (1965). Functional evaluation: the Barthel Index. Md State Med J 142.  -WILLIAM Polanco, INA Kearney (1997). The Barthel 
Lew Coates Alan M. Jette.  Validity of the AM-PAC “6-Clicks” Inpatient Daily Activity and Basic Mobility Short Forms. Physical Therapy Mar 2014, 94 3) 379-391; DOI: 10.2522/ptj.70126716  2. Radha Watters, Clementina MARTIN, Melissa MARTIN. Association of AM-PAC \"6-Clicks\" Basic Mobility and Daily Activity Scores With Discharge Destination. Phys Ther. 2021 Apr 4;101(4):swrq354. doi: 10.1093/ptj/eeod308. PMID: 55962287.  3. Frank J, Nat D, Augusta S, Shirley K, Clarice MONTANO. Activity Measure for Post-Acute Care \"6-Clicks\" Basic Mobility Scores Predict Discharge Destination After Acute Care Hospitalization in Select Patient Groups: A Retrospective, Observational Study. Arch Rehabil Res Clin Transl. 2022 Jul 16;4(3):100550. doi: 10.1016/j.arrct.2022.858693. PMID: 06043222; PMCID: [id number].  4. Karmen JASMINE, Prachi S, Fabiana W, Jes P. AM-PAC Short Forms Manual 4.0. Revised 2/2020.                                                                                                                                                                                                                              Activity Tolerance:   Limited by pain right foot    After treatment:   Patient left in no apparent distress sitting up in chair, Call bell within reach, and Bed/ chair alarm activated    COMMUNICATION/EDUCATION:   The patient's plan of care was discussed with: occupational therapist and registered nurse    Patient Education  Education Given To: Patient  Education Provided: Role of Therapy;Plan of Care;Fall Prevention Strategies  Education Method: Verbal  Barriers to Learning: Cognition  Education Outcome: Verbalized understanding;Continued education needed    Thank you for this referral.  Yoly Mccord, PT  Minutes: 17

## 2024-11-05 NOTE — PROGRESS NOTES
Charito Garrido, NP-C                       [phone] cell                 Monday-Thursday 7:30-5:00                               GI PROGRESS NOTE        NAME:   Getachew Durham       :    1960       MRN:    [id number]     Assessment/Plan     GI consult for GIB, acute blood loss anemia. Pt is alert, oriented, and communicative. He is a poor historian so history obtained from prior notes and nursing team. 65 y/o male with no known PMHx other than ETOH abuse (1/5/day) and tobacco use who presented to ER on 10/16/24 after his wife (does not live with him, in process of getting divorce) found him at the bottom of the stairs after an unknown downtime. Admitted to ICU for profound electrolyte abnormalities.He was noted to have K 2.1, chloride 83, BUN/creat 132/4.11 Ag 25, lactic acid 14.27, BNP 3,591 with WBC 17. He was given 2 liters LR as well as cefepime for concerns of sepsis. CIWA protocol.   His hgb is 6.8 down from 7.8/8.4 yesterday and 11.6 on 10/16/2024. Nurse is getting 1uPRBC.   Nurse reports that he's been passing several dark stools.   CTAP WO Contrast 10/16 showed Moderate to large rectal stool burden.   Was able to speak with his brother and update him on POC. Discussed EGD procedure indications and risks. He mentioned concern in regards to family history. +CRC Fhx father dx 78, maternal uncle late 40s, early 50s. Pt has never had colonoscopy.     Impression  Melena  Anemia  Metabolic encephalopathy  INDY  Alcohol withdrawal syndrome  Uremia  Family history of colon cancer    10/21/2024: EGD 10/18/2024 by Dr. Ramos:  Findings:  Esophagus:Diffuse severely scarred and ulcerated erosive Grade-D esophagitis with mild contact friability but no intervenable lesion from 28cm until EGJ at 40cm with a 3cm healthy hiatal hernia hill-3 GEFV. No esophageal varices.  Stomach: Old blood coating stomach. Lavaged 
      Hospitalist Progress Note    NAME:   Getachew Durham   : 1960   MRN: [id number]     Date/Time: 10/22/2024 8:37 AM  Patient PCP: No primary care provider on file.    Estimated discharge date: 10/23/2024  Barriers: Clinical improvement, needs PT OT eval, episodic hypotension    Icu course:    62-year-old male with unknown past medical history the exception of alcohol use and tobacco use, who was brought in by family after being found down.  Patient was admitted with acute encephalopathy likely from uremia and electrolyte derangement, acute GI bleed, INDY and alcohol withdrawal syndrome.  Patient was initially admitted to ICU due to profound electrolyte abnormalities.  Currently the patient is improving, mentation has improved, INDY is improving.  Patient stable for downgrade to medicine floors.    Assessment / Plan:   Acute encephalopathy likely multifactorial due to uremia and electrolyte derangement, improving  CT head was normal, LFTs nonactionable, ethanol was negative  BUN on arrival-132>> improved to 45  Creatinine on arrival 4.11>> improved to 1.65  Total   Mentation has improved, alert and oriented x 2  Ordered for PT OT eval -Advised for skilled nursing facility.  However patient does not have insurance-May need to discharge home    Uremia  INDY , CKD stage III  Likely multifactorial due to hypovolemia from dehydration, alcoholism, GI bleed  Mild hypokalemia  Acute urinary retention on arrival  CT abdomen pelvis done on 10/16/2024-punctate nonobstructing right nephrolithiasis  Creatinine and BUN has improved with hydration.  Continue daily BMP  Avoid nephrotoxic agents renally dose medications  Nephrology consult if worsening creatinine  Durham catheter was placed by urology services-further note on 10/18/2024-need to reconsult urology prior to discharge.    Acute GI bleed  Esophagitis  Status post EGD 10/18/2024 which shows friable mucosa esophagitis, clots in the stomach no active 
      Hospitalist Progress Note    NAME:   Getachew Durham   : 1960   MRN: [id number]     Date/Time: 10/19/2024 2:11 PM  Patient PCP: No primary care provider on file.    Estimated discharge date: 10/21/2024  Barriers: GI bleed, symptom resolution, alcohol withdrawal      Assessment / Plan: 62-year-old male with unknown past medical history the exception of alcohol use and tobacco use, who was brought in by family after being found down.  Patient was admitted with acute encephalopathy likely from uremia and electrolyte derangement, acute GI bleed, INDY and alcohol withdrawal syndrome.  Patient was initially admitted to ICU due to profound electrolyte abnormalities.  Currently the patient is improving, mentation has improved, INDY is improving.  Patient stable for downgrade to medicine floors.    Acute encephalopathy likely multifactorial due to uremia and electrolyte derangement, improving  -CT head was normal, LFTs nonactionable, ethanol was negative  -Currently BUN is 68, CR 1.92, sodium 147  -Mentation has improved, alert and oriented x 2  -Continue IV fluids, alcohol withdrawal treatment, MVI/thiamine  -Continue CIWA protocol  Continue to monitor BMP mag Phos    Uremia  INDY , CKD stage III  -Likely multifactorial due to hypovolemia from dehydration, alcoholism, GI bleed  -Currently CR is 1.92, BUN is 68, trending down  - Continue to monitor, IV fluids,    Acute GI bleed  - Status post EGD 10/18/2024 which shows friable mucosa esophagitis, clots in the stomach no active bleed  - Patient was given 1 unit of blood 1018 H&H has been stable continue PPI monitor  - Follow GI recs monitor H&H daily transfuse as needed below 7 hemoglobin    Alcohol withdrawal syndrome,  - Continue CIWA protocol    Skin lesions on the upper back back  -Boil like rash on the back, with a wound, wound care consulted.        Medical Decision Making:   I personally reviewed labs:cmp, bg,cbc  I personally reviewed imaging:      
      Hospitalist Progress Note    NAME:   Getachew Durham   : 1960   MRN: [id number]     Date/Time: 10/20/2024 8:54 AM  Patient PCP: No primary care provider on file.    Estimated discharge date: 10/23/2024  Barriers: Clinical improvement, needs PT OT eval    Icu course:    62-year-old male with unknown past medical history the exception of alcohol use and tobacco use, who was brought in by family after being found down.  Patient was admitted with acute encephalopathy likely from uremia and electrolyte derangement, acute GI bleed, INDY and alcohol withdrawal syndrome.  Patient was initially admitted to ICU due to profound electrolyte abnormalities.  Currently the patient is improving, mentation has improved, INDY is improving.  Patient stable for downgrade to medicine floors.    Assessment / Plan:   Acute encephalopathy likely multifactorial due to uremia and electrolyte derangement, improving  CT head was normal, LFTs nonactionable, ethanol was negative  BUN on arrival-132>> improved to 45  Creatinine on arrival 4.11>> improved to 1.65  Total   Mentation has improved, alert and oriented x 2    Uremia  INDY , CKD stage III  Likely multifactorial due to hypovolemia from dehydration, alcoholism, GI bleed  Creatinine and BUN has improved with hydration.  Continue daily BMP  Avoid nephrotoxic agents renally dose medications  Nephrology consult if worsening creatinine    Acute GI bleed  Esophagitis  Status post EGD 10/18/2024 which shows friable mucosa esophagitis, clots in the stomach no active bleed  Patient was given 1 unit of blood 1018 H&H has been stable continue PPI monitor  Continue PPI  Monitor hbglobin-hemoglobin today 7.1  Transfuse if less than 7    Alcohol withdrawal syndrome  Drinks about half a bottle of bourbon every day  Continue CIWA protocol  Counseled patient against drinking  He will need Resources regarding alcohol use on discharge    Skin lesions on the upper back back  Boil like rash on 
    Hospitalist Progress Note               Daily Progress Note: 10/23/2024      Hospital Day: 8     Chief complaint:   Chief Complaint   Patient presents with    Altered Mental Status     Pt arrives by EMS for AMS. EMS reports that patient's wife found him on floor at the bottom of the stairs. EMS states that patient reported tripping on first step on the stairs at home. They report AMS increasing in route. Pt's wife reports patient does not take medications and she does not live with the patient.        Subjective:   Hospital course to date:  64-year-old gentleman with chronic history of alcohol abuse for unresponsiveness.  Admitted with lots of electrolyte derangements which seem to have improved with IV fluids.  He is awake and alert.    --------  Patient is seen today for follow-up.  Offers no complaints.        Medications reviewed  Current Facility-Administered Medications   Medication Dose Route Frequency    sodium chloride flush 0.9 % injection 5-40 mL  5-40 mL IntraVENous 2 times per day    sodium chloride flush 0.9 % injection 5-40 mL  5-40 mL IntraVENous PRN    0.9 % sodium chloride infusion   IntraVENous PRN    LORazepam (ATIVAN) tablet 1 mg  1 mg Oral Q1H PRN    Or    LORazepam (ATIVAN) injection 1 mg  1 mg IntraVENous Q1H PRN    Or    LORazepam (ATIVAN) tablet 2 mg  2 mg Oral Q1H PRN    Or    LORazepam (ATIVAN) injection 2 mg  2 mg IntraVENous Q1H PRN    Or    LORazepam (ATIVAN) tablet 3 mg  3 mg Oral Q1H PRN    Or    LORazepam (ATIVAN) injection 3 mg  3 mg IntraVENous Q1H PRN    Or    LORazepam (ATIVAN) tablet 4 mg  4 mg Oral Q1H PRN    Or    LORazepam (ATIVAN) injection 4 mg  4 mg IntraVENous Q1H PRN    pantoprazole (PROTONIX) tablet 40 mg  40 mg Oral BID AC    balsum peru-castor oil (VENELEX) ointment   Topical BID    0.9 % sodium chloride infusion   IntraVENous PRN    insulin lispro (HUMALOG,ADMELOG) injection vial 0-4 Units  0-4 Units SubCUTAneous 4x Daily AC & HS    glucose chewable tablet 16 g  
    Hospitalist Progress Note               Daily Progress Note: 10/25/2024      Hospital Day: 10     Chief complaint:   Chief Complaint   Patient presents with    Altered Mental Status     Pt arrives by EMS for AMS. EMS reports that patient's wife found him on floor at the bottom of the stairs. EMS states that patient reported tripping on first step on the stairs at home. They report AMS increasing in route. Pt's wife reports patient does not take medications and she does not live with the patient.        Subjective:   Hospital course to date:  64-year-old gentleman with chronic history of alcohol abuse for unresponsiveness.  Admitted with lots of electrolyte derangements which seem to have improved with IV fluids.  He is awake and alert.    --------  Patient is seen today for follow-up.  Offers no complaints.        Medications reviewed  Current Facility-Administered Medications   Medication Dose Route Frequency    potassium phosphate (monobasic) (K-PHOS) tablet 250 mg  250 mg Oral 4x Daily WC    ziprasidone (GEODON) injection 10 mg  10 mg IntraMUSCular Q6H PRN    nicotine (NICODERM CQ) 14 MG/24HR 1 patch  1 patch TransDERmal Daily    sodium chloride flush 0.9 % injection 5-40 mL  5-40 mL IntraVENous 2 times per day    sodium chloride flush 0.9 % injection 5-40 mL  5-40 mL IntraVENous PRN    0.9 % sodium chloride infusion   IntraVENous PRN    LORazepam (ATIVAN) tablet 1 mg  1 mg Oral Q1H PRN    Or    LORazepam (ATIVAN) injection 1 mg  1 mg IntraVENous Q1H PRN    Or    LORazepam (ATIVAN) tablet 2 mg  2 mg Oral Q1H PRN    Or    LORazepam (ATIVAN) injection 2 mg  2 mg IntraVENous Q1H PRN    Or    LORazepam (ATIVAN) tablet 3 mg  3 mg Oral Q1H PRN    Or    LORazepam (ATIVAN) injection 3 mg  3 mg IntraVENous Q1H PRN    Or    LORazepam (ATIVAN) tablet 4 mg  4 mg Oral Q1H PRN    Or    LORazepam (ATIVAN) injection 4 mg  4 mg IntraVENous Q1H PRN    pantoprazole (PROTONIX) tablet 40 mg  40 mg Oral BID AC    balsum 
    Hospitalist Progress Note               Daily Progress Note: 10/26/2024      Hospital Day: 11     Chief complaint:   Chief Complaint   Patient presents with    Altered Mental Status     Pt arrives by EMS for AMS. EMS reports that patient's wife found him on floor at the bottom of the stairs. EMS states that patient reported tripping on first step on the stairs at home. They report AMS increasing in route. Pt's wife reports patient does not take medications and she does not live with the patient.        Subjective:   Hospital course to date:  64-year-old gentleman with chronic history of alcohol abuse for unresponsiveness.  Admitted with lots of electrolyte derangements which seem to have improved with IV fluids.  He is awake and alert.    --------  Patient is seen today for follow-up.  Offers no complaints.        Medications reviewed  Current Facility-Administered Medications   Medication Dose Route Frequency    ziprasidone (GEODON) injection 10 mg  10 mg IntraMUSCular Q6H PRN    nicotine (NICODERM CQ) 14 MG/24HR 1 patch  1 patch TransDERmal Daily    sodium chloride flush 0.9 % injection 5-40 mL  5-40 mL IntraVENous 2 times per day    sodium chloride flush 0.9 % injection 5-40 mL  5-40 mL IntraVENous PRN    0.9 % sodium chloride infusion   IntraVENous PRN    LORazepam (ATIVAN) tablet 1 mg  1 mg Oral Q1H PRN    Or    LORazepam (ATIVAN) injection 1 mg  1 mg IntraVENous Q1H PRN    Or    LORazepam (ATIVAN) tablet 2 mg  2 mg Oral Q1H PRN    Or    LORazepam (ATIVAN) injection 2 mg  2 mg IntraVENous Q1H PRN    Or    LORazepam (ATIVAN) tablet 3 mg  3 mg Oral Q1H PRN    Or    LORazepam (ATIVAN) injection 3 mg  3 mg IntraVENous Q1H PRN    Or    LORazepam (ATIVAN) tablet 4 mg  4 mg Oral Q1H PRN    Or    LORazepam (ATIVAN) injection 4 mg  4 mg IntraVENous Q1H PRN    pantoprazole (PROTONIX) tablet 40 mg  40 mg Oral BID AC    balsum peru-castor oil (VENELEX) ointment   Topical BID    0.9 % sodium chloride infusion   
    Hospitalist Progress Note               Daily Progress Note: 10/27/2024      Hospital Day: 12     Chief complaint:   Chief Complaint   Patient presents with    Altered Mental Status     Pt arrives by EMS for AMS. EMS reports that patient's wife found him on floor at the bottom of the stairs. EMS states that patient reported tripping on first step on the stairs at home. They report AMS increasing in route. Pt's wife reports patient does not take medications and she does not live with the patient.        Subjective:   Hospital course to date:  64-year-old gentleman with chronic history of alcohol abuse for unresponsiveness.  Admitted with lots of electrolyte derangements which seem to have improved with IV fluids.  He is awake and alert.    --------  Patient is seen today for follow-up.  Offers no complaints.Ambulating in the hallways        Medications reviewed  Current Facility-Administered Medications   Medication Dose Route Frequency    ziprasidone (GEODON) injection 10 mg  10 mg IntraMUSCular Q6H PRN    nicotine (NICODERM CQ) 14 MG/24HR 1 patch  1 patch TransDERmal Daily    sodium chloride flush 0.9 % injection 5-40 mL  5-40 mL IntraVENous 2 times per day    sodium chloride flush 0.9 % injection 5-40 mL  5-40 mL IntraVENous PRN    0.9 % sodium chloride infusion   IntraVENous PRN    LORazepam (ATIVAN) tablet 1 mg  1 mg Oral Q1H PRN    Or    LORazepam (ATIVAN) injection 1 mg  1 mg IntraVENous Q1H PRN    Or    LORazepam (ATIVAN) tablet 2 mg  2 mg Oral Q1H PRN    Or    LORazepam (ATIVAN) injection 2 mg  2 mg IntraVENous Q1H PRN    Or    LORazepam (ATIVAN) tablet 3 mg  3 mg Oral Q1H PRN    Or    LORazepam (ATIVAN) injection 3 mg  3 mg IntraVENous Q1H PRN    Or    LORazepam (ATIVAN) tablet 4 mg  4 mg Oral Q1H PRN    Or    LORazepam (ATIVAN) injection 4 mg  4 mg IntraVENous Q1H PRN    pantoprazole (PROTONIX) tablet 40 mg  40 mg Oral BID AC    balsum peru-castor oil (VENELEX) ointment   Topical BID    0.9 % sodium 
    Hospitalist Progress Note               Daily Progress Note: 10/29/2024      Hospital Day: 14     Chief complaint:   Chief Complaint   Patient presents with    Altered Mental Status     Pt arrives by EMS for AMS. EMS reports that patient's wife found him on floor at the bottom of the stairs. EMS states that patient reported tripping on first step on the stairs at home. They report AMS increasing in route. Pt's wife reports patient does not take medications and she does not live with the patient.        Subjective:   Hospital course to date:  64-year-old gentleman with chronic history of alcohol abuse for unresponsiveness.  Admitted with lots of electrolyte derangements which seem to have improved with IV fluids.  He is awake and alert.    --------  Patient is seen today for follow-up.  Reports right ankle swelling and pain. No history of gout. Ambulating in the hallways        Medications reviewed  Current Facility-Administered Medications   Medication Dose Route Frequency    ziprasidone (GEODON) injection 10 mg  10 mg IntraMUSCular Q6H PRN    nicotine (NICODERM CQ) 14 MG/24HR 1 patch  1 patch TransDERmal Daily    sodium chloride flush 0.9 % injection 5-40 mL  5-40 mL IntraVENous 2 times per day    sodium chloride flush 0.9 % injection 5-40 mL  5-40 mL IntraVENous PRN    0.9 % sodium chloride infusion   IntraVENous PRN    LORazepam (ATIVAN) tablet 1 mg  1 mg Oral Q1H PRN    Or    LORazepam (ATIVAN) injection 1 mg  1 mg IntraVENous Q1H PRN    Or    LORazepam (ATIVAN) tablet 2 mg  2 mg Oral Q1H PRN    Or    LORazepam (ATIVAN) injection 2 mg  2 mg IntraVENous Q1H PRN    Or    LORazepam (ATIVAN) tablet 3 mg  3 mg Oral Q1H PRN    Or    LORazepam (ATIVAN) injection 3 mg  3 mg IntraVENous Q1H PRN    Or    LORazepam (ATIVAN) tablet 4 mg  4 mg Oral Q1H PRN    Or    LORazepam (ATIVAN) injection 4 mg  4 mg IntraVENous Q1H PRN    pantoprazole (PROTONIX) tablet 40 mg  40 mg Oral BID AC    balsum peru-castor oil (VENELEX) 
    Hospitalist Progress Note               Daily Progress Note: 11/1/2024      Hospital Day: 17     Chief complaint:   Chief Complaint   Patient presents with    Altered Mental Status     Pt arrives by EMS for AMS. EMS reports that patient's wife found him on floor at the bottom of the stairs. EMS states that patient reported tripping on first step on the stairs at home. They report AMS increasing in route. Pt's wife reports patient does not take medications and she does not live with the patient.        Subjective:   Hospital course to date:  64-year-old gentleman with chronic history of alcohol abuse for unresponsiveness.  Admitted with lots of electrolyte derangements which seem to have improved with IV fluids.  He is awake and alert.    --------          Medications reviewed  Current Facility-Administered Medications   Medication Dose Route Frequency    0.9 % sodium chloride infusion   IntraVENous PRN    magnesium sulfate 2000 mg in 50 mL IVPB premix  2,000 mg IntraVENous Once    ziprasidone (GEODON) injection 10 mg  10 mg IntraMUSCular Q6H PRN    nicotine (NICODERM CQ) 14 MG/24HR 1 patch  1 patch TransDERmal Daily    sodium chloride flush 0.9 % injection 5-40 mL  5-40 mL IntraVENous 2 times per day    sodium chloride flush 0.9 % injection 5-40 mL  5-40 mL IntraVENous PRN    0.9 % sodium chloride infusion   IntraVENous PRN    LORazepam (ATIVAN) tablet 1 mg  1 mg Oral Q1H PRN    Or    LORazepam (ATIVAN) injection 1 mg  1 mg IntraVENous Q1H PRN    Or    LORazepam (ATIVAN) tablet 2 mg  2 mg Oral Q1H PRN    Or    LORazepam (ATIVAN) injection 2 mg  2 mg IntraVENous Q1H PRN    Or    LORazepam (ATIVAN) tablet 3 mg  3 mg Oral Q1H PRN    Or    LORazepam (ATIVAN) injection 3 mg  3 mg IntraVENous Q1H PRN    Or    LORazepam (ATIVAN) tablet 4 mg  4 mg Oral Q1H PRN    Or    LORazepam (ATIVAN) injection 4 mg  4 mg IntraVENous Q1H PRN    pantoprazole (PROTONIX) tablet 40 mg  40 mg Oral BID AC    balsum peru-castor oil 
    Hospitalist Progress Note               Daily Progress Note: 11/2/2024      Hospital Day: 18     Chief complaint:   Chief Complaint   Patient presents with    Altered Mental Status     Pt arrives by EMS for AMS. EMS reports that patient's wife found him on floor at the bottom of the stairs. EMS states that patient reported tripping on first step on the stairs at home. They report AMS increasing in route. Pt's wife reports patient does not take medications and she does not live with the patient.        Subjective:   Hospital course to date:  64-year-old gentleman with chronic history of alcohol abuse for unresponsiveness.  Admitted with lots of electrolyte derangements which seem to have improved with IV fluids.  He is awake and alert.  Family at bedside, patient was advised to use cam boot yesterday, he said he does not want to use that, discussed importance of using it so swelling and sprain can improve, he said he understands it and will use it    --------          Medications reviewed  Current Facility-Administered Medications   Medication Dose Route Frequency    0.9 % sodium chloride infusion   IntraVENous PRN    ziprasidone (GEODON) injection 10 mg  10 mg IntraMUSCular Q6H PRN    nicotine (NICODERM CQ) 14 MG/24HR 1 patch  1 patch TransDERmal Daily    sodium chloride flush 0.9 % injection 5-40 mL  5-40 mL IntraVENous 2 times per day    sodium chloride flush 0.9 % injection 5-40 mL  5-40 mL IntraVENous PRN    0.9 % sodium chloride infusion   IntraVENous PRN    LORazepam (ATIVAN) tablet 1 mg  1 mg Oral Q1H PRN    Or    LORazepam (ATIVAN) injection 1 mg  1 mg IntraVENous Q1H PRN    Or    LORazepam (ATIVAN) tablet 2 mg  2 mg Oral Q1H PRN    Or    LORazepam (ATIVAN) injection 2 mg  2 mg IntraVENous Q1H PRN    Or    LORazepam (ATIVAN) tablet 3 mg  3 mg Oral Q1H PRN    Or    LORazepam (ATIVAN) injection 3 mg  3 mg IntraVENous Q1H PRN    Or    LORazepam (ATIVAN) tablet 4 mg  4 mg Oral Q1H PRN    Or    LORazepam 
    Hospitalist Progress Note               Daily Progress Note: 11/3/2024      Hospital Day: 19     Chief complaint:   Chief Complaint   Patient presents with    Altered Mental Status     Pt arrives by EMS for AMS. EMS reports that patient's wife found him on floor at the bottom of the stairs. EMS states that patient reported tripping on first step on the stairs at home. They report AMS increasing in route. Pt's wife reports patient does not take medications and she does not live with the patient.        Subjective:   Hospital course to date:  64-year-old gentleman with chronic history of alcohol abuse for unresponsiveness.  Admitted with lots of electrolyte derangements which seem to have improved with IV fluids.  He is awake and alert.  He said he used his boot for some time yesterday.  --------          Medications reviewed  Current Facility-Administered Medications   Medication Dose Route Frequency    0.9 % sodium chloride infusion   IntraVENous PRN    ziprasidone (GEODON) injection 10 mg  10 mg IntraMUSCular Q6H PRN    nicotine (NICODERM CQ) 14 MG/24HR 1 patch  1 patch TransDERmal Daily    sodium chloride flush 0.9 % injection 5-40 mL  5-40 mL IntraVENous 2 times per day    sodium chloride flush 0.9 % injection 5-40 mL  5-40 mL IntraVENous PRN    0.9 % sodium chloride infusion   IntraVENous PRN    LORazepam (ATIVAN) tablet 1 mg  1 mg Oral Q1H PRN    Or    LORazepam (ATIVAN) injection 1 mg  1 mg IntraVENous Q1H PRN    Or    LORazepam (ATIVAN) tablet 2 mg  2 mg Oral Q1H PRN    Or    LORazepam (ATIVAN) injection 2 mg  2 mg IntraVENous Q1H PRN    Or    LORazepam (ATIVAN) tablet 3 mg  3 mg Oral Q1H PRN    Or    LORazepam (ATIVAN) injection 3 mg  3 mg IntraVENous Q1H PRN    Or    LORazepam (ATIVAN) tablet 4 mg  4 mg Oral Q1H PRN    Or    LORazepam (ATIVAN) injection 4 mg  4 mg IntraVENous Q1H PRN    pantoprazole (PROTONIX) tablet 40 mg  40 mg Oral BID AC    balsum peru-castor oil (VENELEX) ointment   Topical BID    
    Hospitalist Progress Note               Daily Progress Note: 11/4/2024      Hospital Day: 20     Chief complaint:   Chief Complaint   Patient presents with    Altered Mental Status     Pt arrives by EMS for AMS. EMS reports that patient's wife found him on floor at the bottom of the stairs. EMS states that patient reported tripping on first step on the stairs at home. They report AMS increasing in route. Pt's wife reports patient does not take medications and she does not live with the patient.        Subjective:   Hospital course to date:  64-year-old gentleman with chronic history of alcohol abuse for unresponsiveness.  Admitted with lots of electrolyte derangements which seem to have improved with IV fluids.  He is awake and alert.  He was wearing his boot, working with PT  --------          Medications reviewed  Current Facility-Administered Medications   Medication Dose Route Frequency    enoxaparin (LOVENOX) injection 40 mg  40 mg SubCUTAneous Daily    0.9 % sodium chloride infusion   IntraVENous PRN    nicotine (NICODERM CQ) 14 MG/24HR 1 patch  1 patch TransDERmal Daily    sodium chloride flush 0.9 % injection 5-40 mL  5-40 mL IntraVENous 2 times per day    sodium chloride flush 0.9 % injection 5-40 mL  5-40 mL IntraVENous PRN    0.9 % sodium chloride infusion   IntraVENous PRN    pantoprazole (PROTONIX) tablet 40 mg  40 mg Oral BID AC    balsum peru-castor oil (VENELEX) ointment   Topical BID    0.9 % sodium chloride infusion   IntraVENous PRN    glucose chewable tablet 16 g  4 tablet Oral PRN    dextrose bolus 10% 125 mL  125 mL IntraVENous PRN    Or    dextrose bolus 10% 250 mL  250 mL IntraVENous PRN    glucagon injection 1 mg  1 mg SubCUTAneous PRN    dextrose 10 % infusion   IntraVENous Continuous PRN    alcohol 62% (NOZIN) nasal  1 ampule  1 ampule Nasal BID    ondansetron (ZOFRAN-ODT) disintegrating tablet 4 mg  4 mg Oral Q8H PRN    Or    ondansetron (ZOFRAN) injection 4 mg  4 mg 
    Patient: Getachew Durham MRN: [id number]  SSN: [id number]    YOB: 1960  Age: 64 y.o.  Sex: male        ADMITTED: 10/16/2024 to Renato Agarwal MD by Renato Agarwal MD for Fall, initial encounter [W19.XXXA]  Alcohol withdrawal seizure with delirium (HCC) [F10.931, R56.9]  Acute renal failure, unspecified acute renal failure type (HCC) [N17.9]  Altered mental status, unspecified altered mental status type [R41.82]  Alcohol use disorder [F10.90]  POD# * No surgery found *     Getachew Durham is doing okay this morning.  Here with metabolic encephalopathy, hypokalemia and retention of urine.  PVR BS this morning 563 mL.  Not urinating.  Attempts with placement of indwelling urethral Durham catheter failed 10/17/2024.  Reportedly he passed urine independently.  However no longer.  Patient with difficulty following commands at this time.  No abdominal pain at this time.  He is having frequent bowel movements.  Incontinent of stool.  He is not known to our practice.  Seen in consultation.    Dr. Aba Fernandez was able to place an 18 [place] tip Durham catheter using bedside cystoscopy.  Return of clear yellow urine in Durham catheter tubing and bag.  No blood clots in tubing or bag.    Labs 10/18/2024 creatinine 2.91 from 3.07 from 3.13 from 4.11 on admission.  WBC 12.8 from 16.9 on admission.  Hemoglobin 6.8 from 7.8 from 8.4 from 11.6.    Vitals: Temp (24hrs), Av.5 °F (36.4 °C), Min:96.9 °F (36.1 °C), Max:97.9 °F (36.6 °C)    Blood pressure 109/84, pulse 96, temperature 97.6 °F (36.4 °C), temperature source Axillary, resp. rate 17, height 1.803 m (5' 11\"), weight 55.6 kg (122 lb 9.2 oz), SpO2 100%.    Intake and Output:  10/16 1901 - 10/18 0700  In: 4948.6 [I.V.:1245.4]  Out: 850 [Urine:850]  10/18 0701 - 10/18 1900  In: -   Out: 200 [Urine:200]  VIVEK Output lats 24 hrs: No data found.   VIVEK Output last 8 hrs: No data found.  BM over last 24 hrs: No data found.    Physical Exam  General: NAD, confused, unable to 
  ICU Progress Note        Subjective:   10/17 - Lying comfortably on the bed. He is communicative but still not completely oriented. He is not agitated.     HPI  This is a 65 y/o M with no known PMHx other than ETOH abuse (1/5/day) and tobacco use who presented to ER on 10/16/24 after his wife (does not live with him) found him at the bottom of the stairs after an unknown downtime. He reported that he fell but is unable to provide details 2/2 confusion.     Vital Signs:    BP 94/69   Pulse 85   Temp (!) 96.1 °F (35.6 °C) (Axillary)   Resp 15   Ht 1.803 m (5' 11\")   Wt 55.6 kg (122 lb 9.2 oz)   SpO2 96%   BMI 17.10 kg/m²             Temp (24hrs), Av.8 °F (36 °C), Min:96.1 °F (35.6 °C), Max:97.6 °F (36.4 °C)       Intake/Output:   Last shift:      No intake/output data recorded.  Last 3 shifts: 10/15 1901 - 10/17 0700  In: 2190.3   Out: -     Intake/Output Summary (Last 24 hours) at 10/17/2024 0826  Last data filed at 10/16/2024 2247  Gross per 24 hour   Intake 2190.3 ml   Output --   Net 2190.3 ml       Physical Exam:    General:  Not in acute distress  HEENT:  Anicteric sclerae; pink palpebral conjunctivae; mucosa moist  Resp:  Bilateral air entry +, no crackles or wheeze  CV:  S1, S2 present  GI:  Abdomen soft, non-tender; (+) active bowel sounds  Extremities:  +2 pulses on all extremities; no edema/ cyanosis/ clubbing noted  Skin:  Warm; no rashes/ lesions noted  Neurologic:  Alert, awake and follows commands    DATA:     Current Facility-Administered Medications   Medication Dose Route Frequency    potassium chloride 10 mEq/100 mL IVPB (Peripheral Line)  10 mEq IntraVENous Q1H    insulin lispro (HUMALOG,ADMELOG) injection vial 0-4 Units  0-4 Units SubCUTAneous 4x Daily AC & HS    glucose chewable tablet 16 g  4 tablet Oral PRN    dextrose bolus 10% 125 mL  125 mL IntraVENous PRN    Or    dextrose bolus 10% 250 mL  250 mL IntraVENous PRN    glucagon injection 1 mg  1 mg SubCUTAneous PRN    dextrose 10 
  ICU Progress Note        Subjective:   10/17 - Lying comfortably on the bed. He is communicative but still not completely oriented. He is not agitated.   10/18 - lying comfortably on the bed. Still pleasantly confused.     HPI  This is a 63 y/o M with no known PMHx other than ETOH abuse (1/5/day) and tobacco use who presented to ER on 10/16/24 after his wife (does not live with him) found him at the bottom of the stairs after an unknown downtime. He reported that he fell but is unable to provide details 2/2 confusion.     Vital Signs:    /87   Pulse (!) 109   Temp 97.6 °F (36.4 °C) (Axillary)   Resp 17   Ht 1.803 m (5' 11\")   Wt 55.6 kg (122 lb 9.2 oz)   SpO2 100%   BMI 17.10 kg/m²             Temp (24hrs), Av.5 °F (36.4 °C), Min:96.9 °F (36.1 °C), Max:97.9 °F (36.6 °C)       Intake/Output:   Last shift:      10/18 0701 - 10/18 1900  In: -   Out: 200 [Urine:200]  Last 3 shifts: 10/16 1901 - 10/18 0700  In: 4948.6 [I.V.:1245.4]  Out: 850 [Urine:850]    Intake/Output Summary (Last 24 hours) at 10/18/2024 0952  Last data filed at 10/18/2024 0830  Gross per 24 hour   Intake 2748.31 ml   Output 1050 ml   Net 1698.31 ml       Physical Exam:    General:  Not in acute distress  HEENT:  Anicteric sclerae; pink palpebral conjunctivae; mucosa moist  Resp:  Bilateral air entry +, no crackles or wheeze  CV:  S1, S2 present  GI:  Abdomen soft, non-tender; (+) active bowel sounds  Extremities:  +2 pulses on all extremities; no edema/ cyanosis/ clubbing noted  Skin:  Warm; no rashes/ lesions noted  Neurologic:  Alert, awake and follows commands    DATA:     Current Facility-Administered Medications   Medication Dose Route Frequency    0.9 % sodium chloride infusion   IntraVENous PRN    potassium phosphate 30 mmol in sodium chloride 0.9 % 500 mL IVPB  30 mmol IntraVENous Once    insulin lispro (HUMALOG,ADMELOG) injection vial 0-4 Units  0-4 Units SubCUTAneous 4x Daily AC & HS    glucose chewable tablet 16 g  4 
  ICU Progress Note        Subjective:   10/17 - Lying comfortably on the bed. He is communicative but still not completely oriented. He is not agitated.   10/18 - lying comfortably on the bed. Still pleasantly confused.   10/19 - Alert, awake and following commands.     HPI  This is a 65 y/o M with no known PMHx other than ETOH abuse (1/5/day) and tobacco use who presented to ER on 10/16/24 after his wife (does not live with him) found him at the bottom of the stairs after an unknown downtime. He reported that he fell but is unable to provide details 2/2 confusion.     Vital Signs:    BP 96/64   Pulse 55   Temp 98.1 °F (36.7 °C) (Oral)   Resp 10   Ht 1.803 m (5' 11\")   Wt 55.6 kg (122 lb 9.2 oz)   SpO2 97%   BMI 17.10 kg/m²             Temp (24hrs), Av.4 °F (36.3 °C), Min:96.3 °F (35.7 °C), Max:98.1 °F (36.7 °C)       Intake/Output:   Last shift:      No intake/output data recorded.  Last 3 shifts: 10/17 1901 - 10/19 0700  In: 5385.4 [P.O.:120; I.V.:3221.1]  Out: 3275 [Urine:3275]    Intake/Output Summary (Last 24 hours) at 10/19/2024 0845  Last data filed at 10/19/2024 0642  Gross per 24 hour   Intake 2637.11 ml   Output 2225 ml   Net 412.11 ml       Physical Exam:    General:  Not in acute distress  HEENT:  Anicteric sclerae; pink palpebral conjunctivae; mucosa moist  Resp:  Bilateral air entry +, no crackles or wheeze  CV:  S1, S2 present  GI:  Abdomen soft, non-tender; (+) active bowel sounds  Extremities:  +2 pulses on all extremities; no edema/ cyanosis/ clubbing noted  Skin:  Warm; no rashes/ lesions noted  Neurologic:  Alert, awake and follows commands    DATA:     Current Facility-Administered Medications   Medication Dose Route Frequency    0.9 % sodium chloride infusion   IntraVENous PRN    pantoprazole (PROTONIX) 40 mg in sodium chloride (PF) 0.9 % 10 mL injection  40 mg IntraVENous Q12H    insulin lispro (HUMALOG,ADMELOG) injection vial 0-4 Units  0-4 Units SubCUTAneous 4x Daily AC & HS    
  Physician Progress Note      PATIENT:               NADIA BRIGGS  CSN #:                  [id number]  :                       1960  ADMIT DATE:       10/16/2024 5:14 PM  DISCH DATE:  RESPONDING  PROVIDER #:        Ever Helms MD          QUERY TEXT:    64yoM pt admitted for unresponsiveness with chronic hx of alcohol abuse. Noted   to have poor po intake, low BMI, dietician assessment with malnutrition   diagnosis on 10/17 and re-evaluated on 10/21. If possible, please document in   progress notes and discharge summary if you are evaluating and /or treating   any of the following:    The medical record reflects the following:  Risk Factors: Alcohol abuse disorder, BMI 17.1 kg/m2, AMS, weight loss  Clinical Indicators:  RD note states, pt disoriented x 3, cachectic appearing;   significant amount of wt loss since 2019 suspect has ETOH related   malnutrition.  Malnutrition Status:  Severe malnutrition (10/17/24 1449)  Context:  Social/Environmental Circumstances  Findings of the 6 clinical characteristics of malnutrition:  Energy Intake:  Unable to assess  Weight Loss:  Unable to assess  Body Fat Loss:  Severe body fat loss Fat Overlying Ribs  Muscle Mass Loss:  Severe muscle mass loss Thigh (quadraceps), Calf   (gastrocnemius)  Fluid Accumulation:  No significant fluid accumulation  Treatment: high dose Thiamine and folate, oral nutrition supplements,   hydration, RD consult.    ASPEN Criteria:    https://aspenjournals.onlinelibrary.amin.com/doi/full/10.1177/370654319320452  5    Thank you,  Ro Ronquillo RN, CDI    ASPEN Criteria:    https://aspenjournals.onlinelibrary.amin.com/doi/full/10.1177/148256662141084  5  Options provided:  -- Severe Malnutrition  -- Severe Protein calorie malnutrition  -- Other - I will add my own diagnosis  -- Disagree - Not applicable / Not valid  -- Disagree - Clinically unable to determine / Unknown  -- Refer to Clinical Documentation Reviewer    PROVIDER RESPONSE 
0304- TRANSFER - IN REPORT:    Verbal report received from Oscar HURTADO on Getachew Durham  being received from ED25 for routine progression of patient care      Report consisted of patient's Situation, Background, Assessment and   Recommendations(SBAR).     Information from the following report(s) Nurse Handoff Report, ED Encounter Summary, ED SBAR, Intake/Output, MAR, and Recent Results was reviewed with the receiving nurse.    0325- Pt arriving to CCU 2518 at this time.    0400- Admission assessment completed and  CIWA assessment documented, see flowsheets.    0417- AM labs drawn and sent.    0450- Pt pulling leads off and and attempting to pull Ivs, BP cuff, gown, and external catheter off. Ji NP notified, restraints ordered.    0700- Bedside, Verbal, and Written shift change report given to Adeola HURTADO (oncoming nurse) by Dodie HURTADO (offgoing nurse). Report included the following information SBAR, Kardex, ED Summary, Intake/Output, MAR, Accordion, Recent Results, Med Rec Status, and Cardiac Rhythm NSR w/ PVCs .     Dodie Callaway RN  
0700 Bedside and Verbal shift change report given to Adeola DE LEON RN (oncoming nurse) by GENESIS Stoll (offgoing nurse). Report included the following information Nurse Handoff Report.     0800 Shift assessment completed. Patient asleep in bed.     1200 Reassessment completed. No acute changes.     1530 Urology at bedside attempting coude placement. Unsuccessful and plan of reassessment from urology in the morning with a possibility of bedside cystoscopy.     1600 Reassessment completed. No acute changes.     1900 Bedside and Verbal shift change report given to GENESIS Peterson (oncoming nurse) by GENESIS Mir (offgoing nurse). Report included the following information Nurse Handoff Report.       
0700: Bedside and Verbal shift change report given to Audrey HURTADO (oncoming nurse) by Elif HURTADO (offgoing nurse). Report included the following information Nurse Handoff Report, Intake/Output, MAR, Recent Results, and Cardiac Rhythm NSR-ST .      1555: Pt.s wife at bedside, asking about when pt. Will be discharged and to where. RN provided update based on note from CM. Wife wants to pass along to  that \"her and the pt. Are  and the town home in which he lives has five flights of stairs.\" RN called weekend  to pass along message. Wife also stated that \"she does not want pt. To be transferred to [facility].\"     End of Shift Note    Bedside shift change report given to Rigoberto HURTADO (oncoming nurse) by Audrey Guy RN (offgoing nurse).  Report included the following information SBAR, Intake/Output, MAR, Recent Results, and Cardiac Rhythm NST-ST    Shift worked:  7a-7p     Shift summary and any significant changes:     See above notes for CM. Pt. Has had uneventful day, was able to shower this morning. Pt. Using walker to ambulate, tolerating well and has ambulated several times back and forth to the bathroom. Pt. Still tachy when ambulating, HR max today 130s while ambulating.      Concerns for physician to address:  .     Zone phone for oncoming shift:   .       Activity:     Number times ambulated in hallways past shift: 1  Number of times OOB to chair past shift: 3    Cardiac:   Cardiac Monitoring: Yes           Access:  Current line(s): PIV     Genitourinary:   Urinary status: voiding    Respiratory:      Chronic home O2 use?: NO  Incentive spirometer at bedside: NO       GI:     Current diet:  ADULT DIET; Regular  DIET ONE TIME MESSAGE;  ADULT ORAL NUTRITION SUPPLEMENT; Breakfast, Dinner; Standard High Calorie/High Protein Oral Supplement  Passing flatus: YES  Tolerating current diet: YES       Pain Management:   Patient states pain is manageable on current regimen: YES    Skin:     Interventions: float 
0700: Bedside and Verbal shift change report given to Audrey HURTADO (oncoming nurse) by Elif HURTADO (offgoing nurse). Report included the following information Nurse Handoff Report, Intake/Output, MAR, Recent Results, and Cardiac Rhythm NSR-ST .     0850: Pt. Requesting to shower, RN informed pt. That shower requires an order from provider. RN messaged Scooter PRESCOTT.     1036: Scooter PRESCOTT gave ok for pt. To shower.     End of Shift Note    Bedside shift change report given to Estephania HURTADO (oncoming nurse) by Audrey Guy RN (offgoing nurse).  Report included the following information SBAR, Intake/Output, MAR, Recent Results, and Cardiac Rhythm NSR-ST    Shift worked:  7a-7p     Shift summary and any significant changes:     See above note. Pt. Becomes tachy, max 130s today when ambulating. Pt. Was a bit unsteady on his feet this evening, RN instructed pt. To call before getting up. Pt. Still has bed alarm refusal signed.      Concerns for physician to address:  None      Zone phone for oncoming shift:   .       Activity:     Number times ambulated in hallways past shift: 0  Number of times OOB to chair past shift: 4    Cardiac:   Cardiac Monitoring: Yes           Access:  Current line(s): PIV     Genitourinary:   Urinary status: voiding    Respiratory:      Chronic home O2 use?: N/A  Incentive spirometer at bedside: N/A       GI:     Current diet:  ADULT DIET; Regular  DIET ONE TIME MESSAGE;  ADULT ORAL NUTRITION SUPPLEMENT; Breakfast, Dinner; Standard High Calorie/High Protein Oral Supplement  Passing flatus: YES  Tolerating current diet: YES       Pain Management:   Patient states pain is manageable on current regimen: YES    Skin:     Interventions: increase time out of bed and PT/OT consult    Patient Safety:  Fall Score:    Interventions: gripper socks and pt to call before getting OOB       Length of Stay:  Expected LOS: 12  Actual LOS: 10      Audrey Guy RN                            
0715- Bedside shift change report given to Shelbie Gregory RN   (oncoming nurse) by Meryl HURTADO (offgoing nurse). Report included the following information Nurse Handoff Report, ED Encounter Summary, Intake/Output, MAR, Recent Results, and Cardiac Rhythm NSR/kirstin .    
0725: Bedside and Verbal shift change report given to Cailin Webster RN (oncoming nurse) by GENESIS Dueñas  (offgoing nurse). Report included the following information Nurse Handoff Report, Index, ED Encounter Summary, Intake/Output, MAR, Recent Results, and Cardiac Rhythm NSR .      0845: Hospitalist notified of potassium of 3.1, PO replacement requested.     1130: Durham removed per MD order.     1235: Pt becoming increasingly restless stating he is going to leave and refusing to wear gown or telemetry leads. Pt attempting to get up from bed and is getting agitated with staff and attempted redirection. CIWA score 11, PRN IV Ativan offered. Pt refused Ativan raising voice at this RN stating \"get that away from me you're not going to sedate me\". AVASURE still at bedside and alarms in place.     1508: Code MONICA called overhead, Pt standing up and not redirectable. Pt getting agitated with staff when assistance is offered, despite him being wobbly on his feet. Pt reminded he is at high risk for falls, he denied and continued to refuse staff assistance while trying to exit into plascencia stating he was leaving. Pt eventually agreeable to sitting up in chair. AVASURE and chair alarm in place.     1602: Code ATLAS called overhead. Pt attempting to exit room with a very unsteady gait. Staff attempted to redirect, pt became combative and began yelling at staff while leaning against wall for support. Pt attempted to push past staff when a family member came to bedside. With assistance from family patient was verbally redirected back to bed. MD notified. Family stated pt may be craving a cigarette, nicotine patch requested from MD.     1815: 250 mL malathi urine voided in urinal, post void bladder scan showing mL.  
0725: Bedside and Verbal shift change report given to Cailin Webster RN (oncoming nurse) by GENESIS Dueñas (offgoing nurse). Report included the following information Nurse Handoff Report, Index, Intake/Output, MAR, and Recent Results.      0830: Pt assisted x1 to bathroom, tolerated fairly well with walker and 1 person assist. Pt returned to bed, alarms set and in place. Gatorade provided to encourage PO intake.     1930: End of Shift Note    Bedside shift change report given to RN (oncoming nurse) by Cailin Webster RN (offgoing nurse).  Report included the following information SBAR, Kardex, ED Summary, Intake/Output, MAR, Recent Results, and Cardiac Rhythm NSR/Simus tach    Shift worked:  2058-2996     Shift summary and any significant changes:     - Pt significantly more oriented and redirectable today. Able to ambulate x1 person assist with walker to bathroom.   - Pt reporting some weakness/lightheadedness with position changes and sustained standing, positive orthostatics.      Concerns for physician to address:       Zone phone for oncoming shift:         Activity:     Number times ambulated in hallways past shift: 0  Number of times OOB to chair past shift: 3    Cardiac:   Cardiac Monitoring: Yes           Access:  Current line(s): PIV     Genitourinary:   Urinary status: voiding    Respiratory:      Chronic home O2 use?: NO  Incentive spirometer at bedside: NO       GI:     Current diet:  ADULT DIET; Regular  DIET ONE TIME MESSAGE;  ADULT ORAL NUTRITION SUPPLEMENT; Breakfast, Dinner; Standard High Calorie/High Protein Oral Supplement  Passing flatus: YES  Tolerating current diet: YES       Pain Management:   Patient states pain is manageable on current regimen: YES    Skin:     Interventions: float heels, increase time out of bed, PT/OT consult, and limit briefs    Patient Safety:  Fall Score:    Interventions: bed/chair alarm, assistive device (walker, cane. etc), gripper socks, pt to call before getting 
0730: Bedside and Verbal shift change report given to Cailin Webster RN (oncoming nurse) by GENESIS Best (offgoing nurse). Report included the following information Nurse Handoff Report, Index, Intake/Output, MAR, and Recent Results.      0959: Pts BP running soft, 84/55 at 0930. Currently 94/59. MD notified.     1100: AVASURE removed from room.    1930: End of Shift Note    Bedside shift change report given to RN (oncoming nurse) by Cailin Webster RN (offgoing nurse).  Report included the following information SBAR, Kardex, ED Summary, Intake/Output, MAR, Recent Results, and Cardiac Rhythm NSR/Sinus tach    Shift worked:  6170-4338     Shift summary and any significant changes:     See note above      BP running soft throughout shift, improved when patient is laying in bed or in chair with legs elevated. Pt asymptomatic. MD notified.      Concerns for physician to address:  Hypotension     Zone phone for oncoming shift:          Activity:     Number times ambulated in hallways past shift: 0  Number of times OOB to chair past shift: 4    Cardiac:   Cardiac Monitoring: Yes           Access:  Current line(s): PIV     Genitourinary:   Urinary status: voiding    Respiratory:      Chronic home O2 use?: NO  Incentive spirometer at bedside: NO       GI:     Current diet:  ADULT DIET; Regular  DIET ONE TIME MESSAGE;  ADULT ORAL NUTRITION SUPPLEMENT; Breakfast, Dinner; Standard High Calorie/High Protein Oral Supplement  Passing flatus: YES  Tolerating current diet: YES       Pain Management:   Patient states pain is manageable on current regimen: YES    Skin:     Interventions: float heels, increase time out of bed, PT/OT consult, and limit briefs    Patient Safety:  Fall Score:    Interventions: bed/chair alarm, assistive device (walker, cane. etc), gripper socks, pt to call before getting OOB, and stay with me (per policy)       Length of Stay:  Expected LOS: 12  Actual LOS: 9      Cailin Webster 
1515. Report received from Jeannine HURTADO for patient transfer out of ICU to room 6863    1900. End of Shift Note    Bedside shift change report given to Meryl HURTADO (oncoming nurse) by Barrington Baptiste, RN (offgoing nurse).  Report included the following information SBAR, Kardex, Intake/Output, MAR, Recent Results, and Cardiac Rhythm NSR    Shift worked:  0350-9712     Shift summary and any significant changes:     Patient resting quietly no ciwa noted, Axo1 pleasantly confused     Concerns for physician to address:       Zone phone for oncoming shift:          Activity:     Number times ambulated in hallways past shift: 0  Number of times OOB to chair past shift: 0    Cardiac:   Cardiac Monitoring: Yes           Access:  Current line(s): PIV     Genitourinary:   Urinary status: gonzalez    Respiratory:      Chronic home O2 use?: NO  Incentive spirometer at bedside: NO       GI:     Current diet:  ADULT DIET; Regular  DIET ONE TIME MESSAGE;  Passing flatus: YES  Tolerating current diet: YES       Pain Management:   Patient states pain is manageable on current regimen: YES    Skin:     Interventions: turn team and increase time out of bed    Patient Safety:  Fall Score:    Interventions: bed/chair alarm, assistive device (walker, cane. etc), gripper socks, and pt to call before getting OOB       Length of Stay:  Expected LOS: 7  Actual LOS: 3      Barrington Baptiste, RN                            
1900 Bedside and Verbal shift change report given to Nicholas HURTADO  (oncoming nurse) by Adeola HURTADO  (offgoing nurse). Report included the following information Intake/Output, MAR, Recent Results, Med Rec Status, Cardiac Rhythm nsr to sinus tach , and Alarm Parameters.     2000 pt is alert and oriented x 1 with some confusion, pt  still in restraints, bmp sent k came back 3.0 NP notified 5 runs of kcl ordered, pt is afebrile, pt is resting at this time    0000 chg bath given, no c/o pain     0400 labs sent awaiting results     0500 hgb 6.8 np notified 1 units ordered     0600 type and screen sent     0700 report given to Jeannine hurtado            
1932: Bedside and Verbal shift change report given to GENESIS Newsome (oncoming nurse) by GENESIS Rodriguez (offgoing nurse). Report included the following information Nurse Handoff Report, ED SBAR, Intake/Output, MAR, Recent Results, Med Rec Status, Cardiac Rhythm NSR, Neuro Assessment, and Event Log.     2000:Shift assessment completed; see flow sheets    0000:Reassessment completed; see flow sheets    0400:Reassessment completed; see flow sheets      0710:Bedside shift change report given to GENESIS Rodriguez (oncoming nurse) by Mercedez Ornelas RN (offgoing nurse).  Report included the following information Kardex, Intake/Output, MAR, Recent Results, Med Rec Status, and Cardiac Rhythm NSR          Mercedez Ornelas RN                            
4 Eyes Skin Assessment     NAME:  Getachew Durham  YOB: 1960  MEDICAL RECORD NUMBER:  [id number]    The patient is being assessed for  Transfer to New Unit    I agree that at least one RN has performed a thorough Head to Toe Skin Assessment on the patient. ALL assessment sites listed below have been assessed.      Areas assessed by both nurses:    Head, Face, Ears, Shoulders, Back, Chest, Arms, Elbows, Hands, Sacrum. Buttock, Coccyx, Ischium, Legs. Feet and Heels, and Under Medical Devices         Does the Patient have a Wound? Yes wound(s) were present on assessment. LDA wound assessment was Initiated and completed by RN already documented by ICU       Montez Prevention initiated by RN: Yes  Wound Care Orders initiated by RN: Yes    Pressure Injury (Stage 3,4, Unstageable, DTI, NWPT, and Complex wounds) if present, place Wound referral order by RN under : No    New Ostomies, if present place, Ostomy referral order under : No     Nurse 1 eSignature: Electronically signed by Barrington Baptiste RN on 10/19/24 at 4:09 PM EDT    **SHARE this note so that the co-signing nurse can place an eSignature**    Nurse 2 eSignature: {Esignature:[id number]}    
Attended Interdisciplinary rounds in CCU; patient care was discussed.    FEMI Perkins, BCC, Staff [facility] Paging Service  [phone]    
Bedside and Verbal shift change report given to GENESIS Dueñas (oncoming nurse) by GENESIS Govea (offgoing nurse). Report included the following information Nurse Handoff Report, ED SBAR, MAR, and Cardiac Rhythm NSR/Tachy .      End of Shift Note    Bedside shift change report given to GENESIS Simeon (oncoming nurse) by Spenser Macedo RN (offgoing nurse).  Report included the following information SBAR, ED Summary, MAR, and Cardiac Rhythm NSR/Tachy    Shift worked:  7P-7A     Shift summary and any significant changes:     N/A     Concerns for physician to address:  N/A     Zone phone for oncoming shift:   N/A           Spenser Macedo RN                           
Bedside and Verbal shift change report given to GENESIS Dueñas (oncoming nurse) by GENESIS Morfin (offgoing nurse). Report included the following information Nurse Handoff Report, ED SBAR, MAR, and Cardiac Rhythm NSR .      1038-Gave pt phenobarbital due to CIWA of 13    203-Gave pt Ativan 2mg per CIWA Score of 12    End of Shift Note    Bedside shift change report given to GENESIS Morfin (oncoming nurse) by Spenser Macedo RN (offgoing nurse).  Report included the following information SBAR, ED Summary, MAR, and Cardiac Rhythm NSR    Shift worked:  7p-7a     Shift summary and any significant changes:     N/A     Concerns for physician to address:  N/A   Zone phone for oncoming shift:   N/A             Spenser Macedo RN                           
Bedside and Verbal shift change report given to GENESIS Dueñas (oncoming nurse) by GENESIS Morfin (offgoing nurse). Report included the following information Nurse Handoff Report, ED SBAR, MAR, and Cardiac Rhythm NSR/Tachy .        2000-PerfectServed NP Louise Patel about possible afib per TELE, advised to complete EKG  2053-EKG completed and inserted into chart   2108-advised per Louise Patel, NP-\"Appears few pacs on ekg. As long as asymptomatic vss and no further rhythms changes just monitor. Am labs in thx\"     End of Shift Note    Bedside shift change report given to GENESIS Govea (oncoming nurse) by Spenser Macedo RN (offgoing nurse).  Report included the following information SBAR, ED Summary, MAR, and Cardiac Rhythm NSR/tachy    Shift worked:  7p-7a     Shift summary and any significant changes:     N/a     Concerns for physician to address:  N/a     Zone phone for oncoming shift:   N/a           Spenser Macedo RN                           
Bedside and Verbal shift change report given to GENESIS Dueñas (oncoming nurse) by GENESIS Simeon (offgoing nurse). Report included the following information Nurse Handoff Report, ED SBAR, MAR, and Cardiac Rhythm NSR/Tachy .    End of Shift Note    Bedside shift change report given to GENESIS Simeon (oncoming nurse) by Spenser Macedo RN (offgoing nurse).  Report included the following information SBAR, ED Summary, MAR, and Cardiac Rhythm NSR/tachy,    Shift worked: 7P-7A     Shift summary and any significant changes:     N/A     Concerns for physician to address:  N/A     Zone phone for oncoming shift:   N/A           Spenser Macedo RN                             
Bedside and Verbal shift change report given to Kat RN (oncoming nurse) by Peace RN  (offgoing nurse). Report included the following information Nurse Handoff Report, Adult Overview, Intake/Output, Recent Results, and Cardiac Rhythm NSR to Sinus Tachy .    
Bedside and Verbal shift change report given to Peace HURTADO (oncoming nurse) by Kat HURTADO  (offgoing nurse). Report included the following information Nurse Handoff Report, Adult Overview, Intake/Output, MAR, Recent Results, and Cardiac Rhythm NSR to Sinus tachy. .        1100- IV removed per MD Helms pt does not need IV access.         1350- Report called into [facility] on [facility].         1435- Pt transported downstairs to room 1115. Nahomy HURTADO assumed responsibility of pt   
Bedside and Verbal shift change report given to Peace HURTADO (oncoming nurse) by Rigoberto RN (offgoing nurse). Report included the following information Nurse Handoff Report, Adult Overview, MAR, and Cardiac Rhythm NSR  .          1108- Therapy into work with pt. Pt c/o right ankle swelling. This writer went into assess pt right ankle. Ankle swollen and warm to touch. Pt stated that he did not have a fall and don't recall doing anything tp injury ankle. MD made aware of finding. Awaiting response.         1531- MD made aware that pt wife was available for call and phone number provided. MD made aware that pt right ankle is still swollen. No new orders as of yet.       1731- Still awaiting MD response for right ankle.       1845- Wound care perform to left mid back. Tolerated well.     
Bedside report received from GENESIS Peterson. Patient is A & O to self and date, follows commands, pulses palpable, BLE dopplered. Pt had multiple incontinent black stools. Dr. Agarwal notified. GI consulted. CIWA = 6. Pt occasionally restless but redirectable.     1127-0790 Pt voiding small amounts per urinal. Post void residual 563. [facility] but no response. Call placed to [facility].    1000 - Consent for blood obtained via 2 nurse phone consent with Feli Suazo RN obtained from patient's brother Ulisses Durham. Pt bp soft. 1 L LR bolus ordered per Dr. Agarwal. PPI added to MAR.    1115 - Consent for blood verified per patient's brother as well as  as there was a discrepancy with pt stickers. 1 unit prbcs started per order.    1145 - Dr. Fernandez at bedside for scope and gonzalez placement. PT voiding via gonzalez malathi urine with Sediment.    Bedside and Verbal shift change report given to GENESIS Newsome (oncoming nurse) by GENESIS Paez (offgoing nurse). Report included the following information Nurse Handoff Report, Index, Intake/Output, MAR, Recent Results, and Med Rec Status.           
Bedside shift change report given to Shelbie Gregory RN   (oncoming nurse) by Spenser HURTADO (offgoing nurse). Report included the following information Nurse Handoff Report, ED Encounter Summary, Intake/Output, MAR, Recent Results, and Cardiac Rhythm Nsr/tach .    
Chart was accessed due to potential transfer to [facility].  
Comprehensive Nutrition Assessment    Type and Reason for Visit:  Initial (low BMI and significant wt loss)    Nutrition Recommendations/Plan:   Continue Clear Liquid diet, RD to add Ensure Clear TID  Recommend placement of NGT/dobbhoff in next 24h if not eating for TF and enteral hydration  If placed, would start Jevity 1.5 @ 15mL/h, advance rate 10mL q 12h as tolerated to Goal of 60mL/h + 175mL flush q 4h (provides 2160kcals/91gPro/310gCHO/2144mL)   HIGH refeeding risk, will order daily K/Phos/Mag checks  Agree with high dose thiamine and folate repletion  Continue MVI  Please document % meals and supplements consumed in flowsheet I/O's under intake      Malnutrition Assessment:  Malnutrition Status:  Severe malnutrition (10/17/24 3609)    Context:  Social/Environmental Circumstances     Findings of the 6 clinical characteristics of malnutrition:  Energy Intake:  Unable to assess  Weight Loss:  Unable to assess     Body Fat Loss:  Severe body fat loss Fat Overlying Ribs   Muscle Mass Loss:  Severe muscle mass loss Thigh (quadraceps), Calf (gastrocnemius)  Fluid Accumulation:  No significant fluid accumulation     Strength:  Not Performed    Nutrition Assessment:  Pt admitted with ETOH seizures and uremic encephalopathy.  PMH: ETOH abuse.  Chart reviewed for low BMI, case discussed during CCU rounds.  Pt lying in bed disoriented x 3, cachectic appearing.  Ex-wife outside the room reports he has not lived with her in 2 years but endorses he has lost a significant amount of wt since that time.  EMR reveals he weighed 195lb in 2019 (and likely as recently as 2022) and he is now down to 122lb.  Suspect he has ETOH related malnutrition.  Pt started on high dose thiamine and folate supplements as well as MVI.  K 2.1 upon admit, now 2.4.  Phos and Mag WNL.   and creat 3.39.  He is not awake and alert enough today to take PO.  Will add PO supplements in case he perks up enough to eat.  He is quite 'dry' per 
Comprehensive Nutrition Assessment    Type and Reason for Visit:  Reassess    Nutrition Recommendations/Plan:   Continue current diet  Ensure Plus BID  Please document % meals and supplements consumed in flowsheet I/O's under intake   Consider NGT for nutrition and hydration until PO intake is more consistent     Malnutrition Assessment:  Malnutrition Status:  Severe malnutrition (10/17/24 1449)    Context:  Social/Environmental Circumstances     Findings of the 6 clinical characteristics of malnutrition:  Energy Intake:  Unable to assess  Weight Loss:  Unable to assess     Body Fat Loss:  Severe body fat loss Fat Overlying Ribs   Muscle Mass Loss:  Severe muscle mass loss Thigh (quadraceps), Calf (gastrocnemius)  Fluid Accumulation:  No significant fluid accumulation     Strength:  Not Performed    Nutrition Assessment:     Chart reviewed. Pt seen with RN at bedside. He has been sleeping all day and missed both breakfast and lunch. PO meds also held. Will add supplements so he can drink them when he wakes up. Otherwise will need to consider alternate means of nutrition soon. It seems pt has consumed little to no nutrition since admission and arrived with severe malnutrition on admission.     Patient Vitals for the past 120 hrs:   PO Meals Eaten (%)   10/18/24 1600 0%     Wt Readings from Last 5 Encounters:   10/16/24 55.6 kg (122 lb 9.2 oz)   ]    Nutrition Related Findings:    Labs: Na 149, K 3.2, Cr 1.35, BUN 27, Mag 1.2.   Meds: KCl, Thiamine. PO meds held.   BM 10/21.   Wound Type: Skin Tears       Current Nutrition Intake & Therapies:    Average Meal Intake: 0%  Average Supplements Intake: None Ordered  ADULT DIET; Regular  DIET ONE TIME MESSAGE;    Anthropometric Measures:  Height: 180.3 cm (5' 11\")  Ideal Body Weight (IBW): 172 lbs (78 kg)       Current Body Weight: 55.6 kg (122 lb 9.2 oz), 71.3 % IBW. Weight Source: Not Specified  Current BMI (kg/m2): 17.1  Usual Body Weight: 88.5 kg (195 lb)  % Weight 
Comprehensive Nutrition Assessment    Type and Reason for Visit:  Reassess    Nutrition Recommendations/Plan:   Continue current diet and supplements  Please document % meals and supplements consumed in flowsheet I/O's under intake      Malnutrition Assessment:  Malnutrition Status:  Severe malnutrition (10/17/24 1449)    Context:  Social/Environmental Circumstances     Findings of the 6 clinical characteristics of malnutrition:  Energy Intake:  Mild decrease in energy intake (Comment)  Weight Loss:  Unable to assess     Body Fat Loss:  Severe body fat loss Fat Overlying Ribs   Muscle Mass Loss:  Severe muscle mass loss Thigh (quadraceps), Calf (gastrocnemius)  Fluid Accumulation:  No significant fluid accumulation     Strength:  Not Performed    Nutrition Assessment:     Chart reviewed. Pt transferring to another room when RD attempted visit. He is awaiting transfer to [facility]. Generally good PO intake of meals documented. No intake of supplements documented. No recent labs taken. No new nutrition concerns at this time.     Patient Vitals for the past 120 hrs:   PO Meals Eaten (%)   10/29/24 1308 76 - 100%   10/29/24 0835 76 - 100%   10/28/24 1255 76 - 100%   10/28/24 0900 1 - 25%   10/26/24 0748 51 - 75%   10/25/24 1830 51 - 75%   10/25/24 1205 0%   10/25/24 1030 76 - 100%   10/24/24 1900 76 - 100%     Wt Readings from Last 5 Encounters:   10/16/24 55.6 kg (122 lb 9.2 oz)   ]    Nutrition Related Findings:    Labs: reviewed.   Meds: Folic acid, Protonix, MVI, Thiamine.   Edema: +3 RLE.   BM 10/24.   Wound Type: Skin Tears, Surgical Incision       Current Nutrition Intake & Therapies:    Average Meal Intake: %  Average Supplements Intake: Unable to assess  ADULT DIET; Regular  DIET ONE TIME MESSAGE;  ADULT ORAL NUTRITION SUPPLEMENT; Breakfast, Dinner; Standard High Calorie/High Protein Oral Supplement    Anthropometric Measures:  Height: 180.3 cm (5' 11\")  Ideal Body Weight (IBW): 172 lbs (78 kg)     
Comprehensive Nutrition Assessment    Type and Reason for Visit:  Reassess    Nutrition Recommendations/Plan:   Continue regular diet  Continue Ensure Plus BID  Please document % meals and supplements consumed in flowsheet I/O's under intake      Malnutrition Assessment:  Malnutrition Status:  Severe malnutrition (10/17/24 1449)    Context:  Social/Environmental Circumstances     Findings of the 6 clinical characteristics of malnutrition:  Energy Intake:  Mild decrease in energy intake (Comment)  Weight Loss:  Unable to assess     Body Fat Loss:  Severe body fat loss Fat Overlying Ribs   Muscle Mass Loss:  Severe muscle mass loss Thigh (quadraceps), Calf (gastrocnemius)  Fluid Accumulation:  No significant fluid accumulation     Strength:  Not Performed    Nutrition Assessment:     Chart reviewed. Pt up in the chair during visit. Lunch <25% consumed and pt complains he needed salt/seasoning so the food was too bland to eat. RD showed him the cup of salt, pepper, seasoning and butter that was included on his tray. Per nursing documentation his intake has been improving and he is drinking the Ensure BID but does not want any more than that. He denies additional concerns at this time. RD encouraged pt to utilize meal menu already at bedside. Will continue monitoring.     Patient Vitals for the past 120 hrs:   PO Meals Eaten (%)   10/23/24 1700 76 - 100%   10/23/24 1300 26 - 50%   10/23/24 0915 1 - 25%       Nutrition Related Findings:    Labs: Phos 2.0.   Meds: Folic acid, Protonix, K-phos, MVI, Thiamine.   BM 10/24 diarrhea.   Wound Type: Skin Tears, Surgical Incision       Current Nutrition Intake & Therapies:    Average Meal Intake: 26-50%, 51-75%  Average Supplements Intake: %  ADULT DIET; Regular  DIET ONE TIME MESSAGE;  ADULT ORAL NUTRITION SUPPLEMENT; Breakfast, Dinner; Standard High Calorie/High Protein Oral Supplement    Anthropometric Measures:  Height: 180.3 cm (5' 11\")  Ideal Body Weight (IBW): 
Dr. Helms to assess pt to determine plan for gonzalez.  1030 - Interdisciplinary rounds were completed. Plan of care discussed.  Goal: see MD orders and progress notes for further interventions and desired outcomes. Verbal order received to remove gonzalez catheter.   
End of Shift Note    Bedside shift change report given to *** (oncoming nurse) by Katharina Latif RN (offgoing nurse).  Report included the following information SBAR, Kardex, and MAR    Shift worked:  1549-2677     Shift summary and any significant changes:    Cross matching for 1 unit PRBC sent, seen by Dr Monroe patient is irritable and reluctant to communicated, consent for BT secured,patient most of the time grumpy and very irritable even to simple communications,monitored closely during blood transfusion, transfusion completed no untoward reaction noted at this time.     Concerns for physician to address:  none           Activity:     Number times ambulated in hallways past shift: 0  Number of times OOB to chair past shift: 4    Cardiac:   Cardiac Monitoring: No           Access:  Current line(s): PIV     Genitourinary:   Urinary status: voiding    Respiratory:      Chronic home O2 use?: NO  Incentive spirometer at bedside: NO       GI:     Current diet:  ADULT DIET; Regular  DIET ONE TIME MESSAGE;  ADULT ORAL NUTRITION SUPPLEMENT; Breakfast, Dinner; Standard High Calorie/High Protein Oral Supplement  Passing flatus: YES  Tolerating current diet: YES       Pain Management:   Patient states pain is manageable on current regimen: YES    Skin:     Interventions: nutritional support    Patient Safety:  Fall Score:    Interventions: gripper socks       Length of Stay:  Expected LOS: 19  Actual LOS: 16      Katharina Latif RN                           
End of Shift Note    Bedside shift change report given to *** (oncoming nurse) by Lisette Winn RN (offgoing nurse).     Shift worked: 7a-7p     Shift summary and any significant changes:    No acute changes this shift.   Pain and swelling to right ankle remains; vascular duplex and CT done today.     Concerns for physician to address: None     Zone phone for oncoming shift:  [phone]       Activity:  Level of Assistance: Independent  Number times ambulated in hallways past shift: 0  Number of times OOB to chair past shift: 1    Cardiac:   Cardiac Monitoring: NO      Access:  Current line(s): PIV    Genitourinary:   Urinary status: Urinary status: Patient is voiding without difficulty.    Respiratory:   oxygen delivery: room air    GI:  Current diet:  DIET: regular  Passing flatus: YES   Tolerating current diet: YES      Pain Management:   Patient states pain is manageable on current regimen: YES      Length of Stay:  Expected LOS: 19  Actual LOS: 15      Lisette Winn, RN                            
End of Shift Note    Bedside shift change report given to ANTONIO (oncoming nurse) by Papito Thompson RN .        Shift worked:  DAYS   Shift summary and any significant changes:     -AWAITING PLACEMENT SNF VS HOME   -PATIENT LIKES TO DO HIS OWN THING      Concerns for physician to address:  NONE   Zone phone for oncoming shift:   [phone]     Patient Information  Getachew Durham  64 y.o.  10/16/2024  5:14 PM by Renato Agarwal MD. Getachew Durham was admitted from [facility]    Problem List  Patient Active Problem List    Diagnosis Date Noted    Severe protein-calorie malnutrition (HCC) 10/17/2024    Alcohol withdrawal seizure with delirium (HCC) 10/16/2024     Past Medical History:   Diagnosis Date    Alcohol abuse      Activity:  Level of Assistance: Contact guard assist, steading assist  Number times ambulated in hallways past shift: 0  Number of times OOB to chair past shift: 5    Respiratory:   O2 Device: None (Room air)    GI:  Last BM (including prior to admit): 10/28/24  Current diet:  ADULT DIET; Regular  DIET ONE TIME MESSAGE;  ADULT ORAL NUTRITION SUPPLEMENT; Breakfast, Dinner; Standard High Calorie/High Protein Oral Supplement  Tolerating current diet: Yes    Pain Management:   Patient states pain is manageable on current regimen: yes    Active Consults:  IP CONSULT TO SOCIAL WORK  IP CONSULT TO UROLOGY  IP CONSULT TO GI  IP WOUND CARE NURSE CONSULT TO EVAL  IP CONSULT TO SOCIAL WORK  IP CONSULT TO GENERAL SURGERY  IP CONSULT TO NEUROLOGY    Length of Stay:  Expected LOS: 15  Actual LOS: 14    Papito Thompson, RN                              
End of Shift Note    Bedside shift change report given to GENESIS Ballard (oncoming nurse) by MANJEET PINZON RN (offgoing nurse).  Report included the following information SBAR, Kardex, and MAR    Shift worked:  7p-7a     Shift summary and any significant changes:     AAOx4, VSS, no complaint of pain, medication given per MAR.     Concerns for physician to address:  no     Zone phone for oncoming shift:   [phone]       Activity:  Level of Assistance: Independent    Cardiac:   Cardiac Monitoring:  no    Access:  Current line(s): PIV     Genitourinary:   Urinary Status: Voiding, Bathroom privileges    Respiratory:   O2 Device: None (Room air)    GI:  Current diet: ADULT DIET; Regular  DIET ONE TIME MESSAGE;  ADULT ORAL NUTRITION SUPPLEMENT; Breakfast, Dinner; Standard High Calorie/High Protein Oral Supplement    Pain Management:   Patient states pain is manageable on current regimen: N/A    Skin:  Montez Scale Score: 20  Interventions: Wound Offloading (Prevention Methods): Pillows, Repositioning  Pressure injury: no    Patient Safety:  Fall Score: Adams Total Score: 85  Fall Risk Interventions  Nursing Judgement-Fall Risk High(Add Comments): Yes  Toilet Every 2 Hours-In Advance of Need: Yes  Hourly Visual Checks: Awake, In bed  Fall Visual Posted: Armband, Fall sign posted, Socks  Room Door Open: Deferred to promote rest  Alarm On: Other (Comment) (refused)  Patient Moved Closer to Nursing Station: No    Active Consults:  IP CONSULT TO SOCIAL WORK  IP CONSULT TO UROLOGY  IP CONSULT TO GI  IP WOUND CARE NURSE CONSULT TO EVAL  IP CONSULT TO SOCIAL WORK  IP CONSULT TO GENERAL SURGERY  IP CONSULT TO NEUROLOGY  IP CONSULT TO ORTHOPEDIC SURGERY    Length of Stay:  Expected LOS: 19  Actual LOS: 19      MANJEET PINZON RN    
End of Shift Note    Bedside shift change report given to GENESIS Can (oncoming nurse) by Anna Nelson RN (offgoing nurse).  Report included the following information SBAR REPORTS LIST: SBAR and MAR    Shift worked:  0314-2448     Shift summary and any significant changes:     None, pt slept most of the night     Concerns for physician to address:  None     Zone phone for oncoming shift:   [phone]       Activity:  Stayed in bed    Cardiac:   Cardiac Monitoring: YES / NO: Yes    Normal sinus    Access:  None    Genitourinary:   Voiding,     Respiratory:   Room air    GI:  Current diet:        Pain Management:   Tylenol     Skin:  Wound on back, wound care consulted, sample sent out      Patient Safety:  Pt signed a \" bed alarm refusal waiver\", bed low/locked, table/ call bell within reach, gripper socks on      Length of Stay:  Expected LOS: 19  Actual LOS: 16      Anna Nelson RN                            
End of Shift Note    Bedside shift change report given to GENESIS Carlton (oncoming nurse) by Aravind Lovelace RN (offgoing nurse).  Report included the following information SBAR, Kardex, and MAR    Shift worked:  7A--7P     Shift summary and any significant changes:     Pt denies any blood/dark stool. Pt denies any pain. Medications given per MAR. Pt refused to wear the boot. MD notified.       Aravind Lovelace RN                            
End of Shift Note    Bedside shift change report given to GENESIS Carlton (oncoming nurse) by Aravind Lovelace RN (offgoing nurse).  Report included the following information SBAR, Kardex, and MAR    Shift worked:  7A-7P     Shift summary and any significant changes:     VSS. Pt c/o R foot pain, medications given per MAR. Safety hourly rounding completed.       Aravind Lovelace RN                            
End of Shift Note    Bedside shift change report given to GENESIS Carlton (oncoming nurse) by Nellie Jackson RN (offgoing nurse).  Report included the following information SBAR    Shift worked:  7a-7p     Shift summary and any significant changes:     Pt waiting for SNF placement. PRN tylenol given for ankle pain.     Concerns for physician to address:  none     Zone phone for oncoming shift:   [phone]       Activity:     Number times ambulated in hallways past shift: 0  Number of times OOB to chair past shift: 2    Cardiac:   Cardiac Monitoring: No           Access:  Current line(s): PIV     Genitourinary:   Urinary status: voiding    Respiratory:      Chronic home O2 use?: NO  Incentive spirometer at bedside: NO       GI:     Current diet:  ADULT DIET; Regular  DIET ONE TIME MESSAGE;  ADULT ORAL NUTRITION SUPPLEMENT; Breakfast, Dinner; Standard High Calorie/High Protein Oral Supplement  Passing flatus: YES  Tolerating current diet: YES       Pain Management:   Patient states pain is manageable on current regimen: YES    Skin:     Interventions: increase time out of bed and PT/OT consult    Patient Safety:  Fall Score:    Interventions: bed/chair alarm       Length of Stay:  Expected LOS: 20  Actual LOS: 19      Nellie Jackson RN                            
End of Shift Note    Bedside shift change report given to GENESIS Corea (oncoming nurse) by MANJEET PINZON RN (offgoing nurse).  Report included the following information SBAR, Kardex, and MAR    Shift worked:  7p-7a     Shift summary and any significant changes:     AAOx4, VSS, pain medication given per MAR.      Concerns for physician to address:  no     Zone phone for oncoming shift:   [phone]       Activity:  Level of Assistance: Independent    Cardiac:   Cardiac Monitoring:  no    Access:  Current line(s): PIV     Genitourinary:   Urinary Status: Voiding, Bathroom privileges    Respiratory:   O2 Device: None (Room air)    GI:  Current diet: ADULT DIET; Regular  DIET ONE TIME MESSAGE;  ADULT ORAL NUTRITION SUPPLEMENT; Breakfast, Dinner; Standard High Calorie/High Protein Oral Supplement    Pain Management:   Patient states pain is manageable on current regimen: YES    Skin:  Montez Scale Score: 20  Interventions: Wound Offloading (Prevention Methods): Pillows, Repositioning  Pressure injury: no    Patient Safety:  Fall Score: Adams Total Score: 85  Fall Risk Interventions  Nursing Judgement-Fall Risk High(Add Comments): Yes  Toilet Every 2 Hours-In Advance of Need: Yes  Hourly Visual Checks: Awake, In bed  Fall Visual Posted: Armband, Fall sign posted, Socks  Room Door Open: Deferred to promote rest  Alarm On: Bed  Patient Moved Closer to Nursing Station: No    Active Consults:  IP CONSULT TO SOCIAL WORK  IP CONSULT TO UROLOGY  IP CONSULT TO GI  IP WOUND CARE NURSE CONSULT TO EVAL  IP CONSULT TO SOCIAL WORK  IP CONSULT TO GENERAL SURGERY  IP CONSULT TO NEUROLOGY  IP CONSULT TO ORTHOPEDIC SURGERY    Length of Stay:  Expected LOS: 20  Actual LOS: 20      MANJEET PINZON RN    
End of Shift Note    Bedside shift change report given to GENESIS Hernandez (oncoming nurse) by MANJEET PINZON RN (offgoing nurse).  Report included the following information SBAR, Kardex, and MAR    Shift worked:  7p-7a     Shift summary and any significant changes:     AAOx4, VSS, no complaint of pain, hourly rounding completed.     Concerns for physician to address:  no     Zone phone for oncoming shift:   [phone]       Activity:  Level of Assistance: Independent    Cardiac:   Cardiac Monitoring:  no    Access:  Current line(s): PIV     Genitourinary:   Urinary Status: Voiding    Respiratory:   O2 Device: None (Room air)    GI:  Current diet: ADULT DIET; Regular  DIET ONE TIME MESSAGE;  ADULT ORAL NUTRITION SUPPLEMENT; Breakfast, Dinner; Standard High Calorie/High Protein Oral Supplement    Pain Management:   Patient states pain is manageable on current regimen: N/A    Skin:  Montez Scale Score: 20  Interventions: Wound Offloading (Prevention Methods): Pillows, Repositioning  Pressure injury: no    Patient Safety:  Fall Score: Adams Total Score: 85  Fall Risk Interventions  Nursing Judgement-Fall Risk High(Add Comments): Yes  Toilet Every 2 Hours-In Advance of Need: Yes  Hourly Visual Checks: Awake  Fall Visual Posted: Armband, Fall sign posted, Socks  Room Door Open: Deferred to promote rest  Alarm On: Other (Comment) (refused)  Patient Moved Closer to Nursing Station: No    Active Consults:  IP CONSULT TO SOCIAL WORK  IP CONSULT TO UROLOGY  IP CONSULT TO GI  IP WOUND CARE NURSE CONSULT TO EVAL  IP CONSULT TO SOCIAL WORK  IP CONSULT TO GENERAL SURGERY  IP CONSULT TO NEUROLOGY  IP CONSULT TO ORTHOPEDIC SURGERY    Length of Stay:  Expected LOS: 19  Actual LOS: 18      MANJEET PINZON RN    
End of Shift Note    Bedside shift change report given to GENESIS Knox (oncoming nurse) by Kathrin Nath RN (offgoing nurse).  Report included the following information SBAR, Intake/Output, MAR, and Cardiac Rhythm NSR to Sinus tachy     Shift worked: nights   Shift summary and any significant changes:    The patient complained of a swollen right ankle. Tylenol and ice were given to address the pain. Blood sugar was slightly elevated at 215, and 1 unit of insulin was administered.     A new dressing was applied to the lesion on the back near the right scapula. No continuous fluids are currently infusing. [facility]   Concerns for physician to address:       Zone phone for oncoming shift:           
End of Shift Note    Bedside shift change report given to GENESIS Nazario (oncoming nurse) by Anna Nelson RN (offgoing nurse).  Report included the following information SBAR REPORTS LIST: SBAR and MAR    Shift worked:  3575-9467     Shift summary and any significant changes:     Pt is eager for d/c, otherwise congruent      Concerns for physician to address:  none     Zone phone for oncoming shift:   [phone]       Activity:  Stayed in bed     Cardiac:   Cardiac Monitoring: YES / NO: Yes    Normal sinus     Access:  20g L ac     Genitourinary:   Voiding,      Respiratory:   Room air     GI:  Current diet:  ADULT DIET; Regular  DIET ONE TIME MESSAGE;  ADULT ORAL NUTRITION SUPPLEMENT; Breakfast, Dinner; Standard High Calorie/High Protein Oral Supplement         Pain Management:   Tylenol      Skin:  Wound on back, wound care consulted, sample sent out       Patient Safety:  Pt signed a \" bed alarm refusal waiver\", bed low/locked, table/ call bell within reach, gripper socks on        Length of Stay:  Expected LOS: 19  Actual LOS: 17      Anna Nelson RN                            
End of Shift Note    Bedside shift change report given to Kat HURTADO (oncoming nurse) by Peace Goldman RN (offgoing nurse).  Report included the following information SBAR, Intake/Output, MAR, and Cardiac Rhythm NSR to Sinus tachy     Shift worked:  7a-7p     Shift summary and any significant changes:     Right ankle swollen and warm to touch      Concerns for physician to address:  Right ankle      Zone phone for oncoming shift:   [phone]       Activity:     Number times ambulated in hallways past shift: 0  Number of times OOB to chair past shift: 3    Cardiac:   Cardiac Monitoring: Yes           Access:  Current line(s): PIV     Genitourinary:   Urinary status: voiding    Respiratory:      Chronic home O2 use?: NO  Incentive spirometer at bedside: NO       GI:     Current diet:  ADULT DIET; Regular  DIET ONE TIME MESSAGE;  ADULT ORAL NUTRITION SUPPLEMENT; Breakfast, Dinner; Standard High Calorie/High Protein Oral Supplement  Passing flatus: YES  Tolerating current diet: YES       Pain Management:   Patient states pain is manageable on current regimen: N/A    Skin:     Interventions: float heels, increase time out of bed, and nutritional support    Patient Safety:  Fall Score:    Interventions: gripper socks       Length of Stay:  Expected LOS: 13  Actual LOS: 12      Peace Goldman, RN                           
End of Shift Note    Bedside shift change report given to Spenser HURTADO (oncoming nurse) by Shelbie Gregory RN (offgoing nurse).  Report included the following information SBAR, ED Summary, Intake/Output, MAR, Recent Results, and Cardiac Rhythm NSR/kirstin    Shift worked:  0386-3234     Shift summary and any significant changes:     Patient has a virtual sitter due to pulling Ivs out and taking off the tele monitor.     Concerns for physician to address:  none     Zone phone for oncoming shift:   [phone]       
End of Shift Note    Bedside shift change report given to Spenser HURTADO (oncoming nurse) by Shelbie Gregory RN (offgoing nurse).  Report included the following information SBAR, ED Summary, Intake/Output, MAR, Recent Results, and Cardiac Rhythm NSR/tach    Shift worked:  2150-5394     Shift summary and any significant changes:     No significant changes      Concerns for physician to address:  none     Zone phone for oncoming shift:   [phone]       Shelbie Gregory, RN                           
Endoscopy recovery  Patient returned to baseline, vital signs stable (see vital sign flowsheet). Patient offered liquids and tolerated well. Respiratory status within defined limits. Abdomen soft not tender. Skin with in defined limits.     1440-Pt returned back to room 2518 via this RN.    
Labs drawn and tubed to lab by this RN. Patient tolerated well  
Obtained telephone consent from HOPE Rizzo with DANIKA Lantigua RN and CHRIS Jack RN  
Occupational Therapy   Chart reviewed; note Hgb 6.5 with possible pending transfusion when patient signs consent; will defer OT; note results review of CT scan R LE:    Bones: No fracture, dislocation, or periosteal reaction.     Joint fluid: Large tibiotalar and moderate subtalar joint effusions     Articulations: Arthritic changes, most severe at the first IP joint. Medial  talar dome osteochondral lesion.     Tendons: No definite full-thickness tendon tear.     Muscles: No intramuscular hematoma. No focal atrophy.    Patient may be more comfortable w/ ambulation with use of RW and walking boot to manage pain and swelling of distal R LE.    Leah Jackson OTR/L     
Occupational Therapy   Chart reviewed; patient sleeping soundly; not arousing to voice, light touch; Chair in room set up for more comfortable access, recommend all meals up in chair; RN for today reports patient continues to c/o pain R ankle after being found down, at bottom of staircase. He lives alone.  Note R foot x-ray clear for fx; possible hairline fx? Will continue to follow, Leah Jackson OTR/L  
Occupational Therapy note:    Orders received, chart reviewed. Attempted to see pt for OT evaluation however pt sleeping soundly upon arrival, unable to be aroused despite max verbal and tactile cueing. Spoke to RN who reports administration of Ativan this AM. Therapy evaluation aborted. Will continue to follow.    Adeola Uribe, OTR/L, OTD  
Orders received, chart reviewed. Attempted to see pt for PT evaluation however pt sleeping soundly upon arrival, unable to be aroused despite max verbal and tactile cueing. Spoke to RN who reports administration of Ativan this AM. Therapy evaluation aborted. Will continue to follow.    Lakia Mehta, PT, DPT  
Pharmacy Automatic Renal Dosing Adjustment     Labs:  Recent Labs     Units 24  0532   CREATININE MG/DL 1.09   BUN MG/DL 14   WBC K/uL 5.4     Temp (24hrs), Av.2 °F (36.8 °C), Min:97.7 °F (36.5 °C), Max:98.6 °F (37 °C)      Creatinine Clearance (mL/min) or Dialysis: Estimated Creatinine Clearance: 55 mL/min (based on SCr of 1.09 mg/dL).      Impression/Plan:   Enoxaparin has been adjusted from 30mg subq daily to 40mg subq daily based on the renal dosing protocol.     Pharmacy will follow daily and adjust medications as appropriate for renal function.    Thank you,  Zacarias Gonzalez, [facility]    
Physical Therapy    Chart reviewed and attempted visit but patient currently having EEG.  Will defer and continue to follow.    PAO SchumacherT  
Physical therapy services attempted 2:59PM. Pt received in side lie and sleeping. Reporting DPT introduced self and attempted to verbally rouse without success. Therapist deferral of additional attempted OOB/GT activity in interest of patient comfort. Per cursory review of medical chart, functional mobility assessment already undertaken, R foot xray (-) negative. PT Team will try to provide skilled services at a later date as time permits and as medically appropriate to patient tolerance.    Taisha Hudson, PT, DPT   
Physical therapy services attempted. Pt pending dc from [facility] with Transport Team present.     Taisha Hudson, PT, DPT    
Rounded on Muslim patients and provided Anointing of the Sick at request of patient/family.  
Routine EEG completed.   
Shift report received from Peace HURTADO     Shift assessment complete, see flow sheet     Uneventful night     Shift report given to Peace HURTADO   
Surgery NP Note    Late Entry from 10/23/24    Clinical decision making made in collaboration with Dr. Goff who is aware of and in agreement with treatment plan.      Patient denies pain in the back.    Packing removed, thick yellow/brown drainage expressed. Irrigated with saline and repacked.     Patient tolerated well. Continue daily dressing changes by nursing.     Surgery to sign off.     CARLY Booth - NP   
WENT IN TO TAKE CONSENT FOR BLOOD TRANSFUSION BUT PATIENT REFUSED TO DO IT NOW HE TOLD, HE TOLD WAIT 30 MIN MORE HE IS NOT CON=MFORTABLE TO SIGN ANYTHING NOW  
10/19/24 1115   Alcohol Cap Used Yes 10/19/24 1115   Dressing Status New dressing applied;Clean, dry & intact 10/19/24 1115   Dressing Type Transparent 10/19/24 1115        Opportunity for questions and clarification was provided.      Patient transported with:  Monitor and Registered Nurse        
+Bowel sounds  Neurologic:  Alert, disoriented, respond to questions  Psych:   Not anxious nor agitated  Skin:  Skin lesions on the back    Reviewed most current lab test results and cultures  YES  Reviewed most current radiology test results   YES  Review and summation of old records today    NO  Reviewed patient's current orders and MAR    YES  PMH/SH reviewed - no change compared to H&P    Procedures: see electronic medical records for all procedures/Xrays and details which were not copied into this note but were reviewed prior to creation of Plan.      LABS:  I reviewed today's most current labs and imaging studies.  Pertinent labs include:  Recent Labs     10/18/24  1809 10/19/24  0426 10/20/24  0547   WBC 11.0 11.4* 9.9   HGB 7.5* 7.9* 7.1*   HCT 21.7* 22.7* 21.0*   PLT 85* 84* 85*     Recent Labs     10/19/24  0426 10/19/24  1054 10/20/24  0547 10/21/24  0551   * 147* 149* 149*   K 4.2 3.9 3.5 3.2*   * 110* 112* 113*   CO2 33* 32 31 34*   GLUCOSE 100 108* 97 90   BUN 76* 68* 45* 27*   CREATININE 2.08* 1.92* 1.65* 1.35*   CALCIUM 8.0* 8.4* 7.8* 7.7*   MG 1.9  --  1.5*  --    PHOS 2.6  --  2.0*  --    BILITOT 1.0 1.0 0.8 0.7   AST 47* 51* 40* 33   ALT 29 31 32 30       Signed: Amanda Patterson MD         
on arrival-132>> improved to 45  Creatinine on arrival 4.11>> improved to 1.65  Total   Mentation has improved, alert and oriented x 2  Ordered for PT OT eval -Advised for skilled nursing facility.  However patient does not have insurance-May need to discharge home     Uremia  INDY , CKD stage III  Likely multifactorial due to hypovolemia from dehydration, alcoholism, GI bleed  Mild hypokalemia  Acute urinary retention on arrival  CT abdomen pelvis done on 10/16/2024-punctate nonobstructing right nephrolithiasis  Creatinine and BUN has improved with hydration.  Continue daily BMP  Avoid nephrotoxic agents renally dose medications  Nephrology consult if worsening creatinine  Durham catheter was placed by urology services-further note on 10/18/2024-need to reconsult urology prior to discharge.     Acute GI bleed  Esophagitis  Status post EGD 10/18/2024 which shows friable mucosa esophagitis, clots in the stomach no active bleed  Patient was given 1 unit of blood 1018 H&H has been stable continue PPI monitor  Continue PPI  Monitor hbglobin-hemoglobin today 7.8  Transfuse if less than 7  GI has cleared the patient for discharge  Will follow-up daily CBC     Alcohol withdrawal syndrome  Drinks about half a bottle of bourbon every day  Continue CIWA protocol  Counseled patient against drinking  He will need Resources regarding alcohol use on discharge     Skin lesions on the upper back back  Boil like rash on the back, with a wound, wound care consulted. Sample sent for culture. NGTD  Continue wound care as nursing           Code status:Full    Social determinants of health: none      Estimated discharge date//time frame/disposition:10/29    Barriers to discharge: SNF placement    Time spent: 35 minutes      Ever Helms MD  
arrival  CT abdomen pelvis done on 10/16/2024-punctate nonobstructing right nephrolithiasis  Creatinine and BUN has improved with hydration.  Continue daily BMP  Avoid nephrotoxic agents renally dose medications  Nephrology consult if worsening creatinine  Durham catheter was placed by urology services-further note on 10/18/2024-need to reconsult urology prior to discharge.     Acute GI bleed  Esophagitis  Status post EGD 10/18/2024 which shows friable mucosa esophagitis, clots in the stomach no active bleed  Patient was given 1 unit of blood 1018 H&H has been stable continue PPI monitor  Continue PPI  Monitor hbglobin-hemoglobin today 7.8  Transfuse if less than 7  GI has cleared the patient for discharge  Will follow-up daily CBC     Alcohol withdrawal syndrome  Drinks about half a bottle of bourbon every day  Continue CIWA protocol  Counseled patient against drinking  He will need Resources regarding alcohol use on discharge     Skin lesions on the upper back back  Boil like rash on the back, with a wound, wound care consulted.  Surgery work consulted today-today continue I&D at bedside today.  Sample sent for culture.    Continue wound care as nursing           Code status:Full    Social determinants of health: none      Estimated discharge date//time frame/disposition:    Barriers to discharge: SNF placement    Time spent: 35 minutes      Ever Helms MD  
health: none      Estimated discharge date//time frame/disposition:TBD    Barriers to discharge: SNF placement,/[facility] transfer    Time spent: 35 minutes      Jean Monroe MD  
none      Estimated discharge date//time frame/disposition:TBD    Barriers to discharge: SNF placement,/[facility] transfer    Time spent: 35 minutes      Jean Monroe MD

## 2024-11-05 NOTE — DISCHARGE INSTRUCTIONS
HOSPITALIST DISCHARGE INSTRUCTIONS    NAME: Getachew Durham   :  1960   MRN:  [id number]     Date/Time:  2024 8:47 AM    ADMIT DATE: 10/16/2024   DISCHARGE DATE: 2024     Attending Physician: Jean Monroe MD  Acute encephalopathy  INDY , CKD stage III  Acute urinary retention on arrival, resolved  Acute GI bleed, resolved  Esophagitis  Alcohol withdrawal syndrome, resolved  Skin lesions on the upper back back    Medications: Per above medication reconciliation.    Pain Management: per above medications    Recommended diet: cardiac diet    Recommended activity: activity as tolerated    Wound care: None    Indwelling devices:  None    Supplemental Oxygen: None    Required Lab work: check CBC within 3-5 days    Glucose management:  None    Code status: Full code

## 2024-11-05 NOTE — CARE COORDINATION
Transition of Care Plan:     RUR: 12% (low RUR)    Prior Level of Functioning: Unsure    Disposition: [facility] bed pending vs. return home with follow ups;   substance resources left on AVS ([facility], [facility], [facility])  -CM exploring DC options and trying to work with Pt/wife.     JMIMY: 10/28/2024    If SNF or IPR: Date FOC offered: N/A  Date FOC received: N/A  Accepting facility: N/A  Date authorization started with reference number: N/A  Date authorization received and expires: N/A  Follow up appointments: PCP/specialists.  The following contact information listed on AVS for new PCP follow ups: [facility], [facility] and [facility].  [facility] contact information also listed on AVS.  DME needed: TBD.  Patient does not appear to have insurance coverage at this time.  Transportation at discharge: Family anticipated  IM/IMM Medicare/ letter given: N/A Pending Medicaid  Is patient a [place] and connected with VA? No.              If yes, was  transfer form completed and VA notified? N/A  Caregiver Contact: Kelly Galicia - spouse - [phone]  Discharge Caregiver contacted prior to discharge? CM to contact, if needed.  Care Conference needed? No.  Barriers to discharge: JOSE, [facility] Bed    3:49 PM   CM talked to WifeKelly and she wanted to talk to MD and then she will talk with me about plan    Talked to  and suggested that we check with [facility] and [facility] to have them screen for appropriateness for [facility] Bed with Medicaid Pending. IPR please do not reach out to Pt/family as we want to see if this is appropriate. - pending    [facility] Transition Coordinator updated that wife is considering that for transfer.     1:22 PM   CM sent an email to [facility]  to see if they have any beds available.     Jie Callaway, WERO  [phone]        
  Transition of Care Plan:     RUR: 10%  Prior Level of Functioning: Unsure  Disposition: [facility] Bed pending or SNF   JIMMY: 11/4/24  If SNF or IPR: Date FOC offered:   Date FOC received:   Accepting facility:   Date authorization started with reference number:   Date authorization received and expires:   Follow up appointments: Defer to facility   DME needed: Defer to facility   Transportation at discharge: Family anticipated   IM/IMM Medicare/ letter given: N/A-Medicaid pending   Is patient a  and connected with VA? No              If yes, was  transfer form completed and VA notified? No  Caregiver Contact: Pt's brother   Discharge Caregiver contacted prior to discharge? Pt will be contacted   Care Conference needed? None   Barriers to discharge: Placement       CM reviewed pt's chart and pt is not medically stable for d/c due to pt's Hgb was 6.5.     [facility] has reached out to  regarding a bed and at this time they do not have a bed.     WERO spoke to Nia buchanan with [facility] and at this time A is unable to accept pt.    WERO spoke to pt's brother, Ulisses Durham, to update him regarding placement. CM explain to pt's brother that this weekend they need to come up with options regarding pt may need to return home. Pt's brother stated to CM that they are unable to provide the care pt will need and that pt really needs therapy. CM express that she understood but at this time placement is a barrier because of no payor source.     WERO has staff this with her .     CM will continue to follow and assist with d/c planning.    Maddie Sanchez    [id number]  
0929 - PT/OT is recommending SNF.  Patient's Medicaid application T# is: [id number] per [employer] documentation.  CM contacted Kell with A to see if there are any [facility] SNF beds that can accept a patient with Medicaid pending.  [facility] may be an option, if accepted.    [facility] Transfer Request Form sent.      LUISA RobertoN, RN    Care Management  [phone]  
[phone] - GC contacted patient's spouse, Kelly Galicia, at [phone] who confirmed that patient lives alone, she has keys to let him inside and either herself or his brother, Ulisses, will provide transportation on discharge.  She confirmed that she will begin working to see who can stay with patient at home, if needed.      Nellie Augustine, LUISAN, RN    Care Management  [phone]  
Care Management Initial Assessment       RUR: 14  Readmission? No  1st IM letter given? No  1st  letter given: No    No insurance listed.   Private Pay: [facility] needs to screen Pt for Medicaid and provide Financial Application.   - Per Pt he indicated that he is  from his wife but not .  He was not sure if she has him on his insurance or not.   -CM attempted to call Wife and number listed will not allow CM to leave a .    -Previous CM talked to Brother and he indicated that he was unable to verify information as he has not talked to him in months.  RN has been using Brother as NOK since we are not able to reach any other family.  Pt stated he has no kids.     11:49 AM  -Plan Home Alone.   -GI to do EGD around 2 PM today.   -Please make sure Pt can afford Meds at DC since Pt has no insurance.   -CM will need to provide Free Clinic Resources to him prior to DC.  -CM to offer Substance Abuse Resources prior to DC.   -Pt does not have a PCP and was not taking any meds prior to hospitalization.   -Unsure if Pt has keys to enter home or how we will transport home.  We will need to explore that more with him prior to DC when he is more cooperative and more alert.     Advance Care Planning     General Advance Care Planning (ACP) Conversation    Date of Conversation: 10/18/2024  Conducted with: Patient with Decision Making Capacity  Other persons present: None    Healthcare Decision Maker: Wife:  Kelly Galicia: [phone]  Brother: Ulisses Durham: [phone]    Content/Action Overview:  DECLINED ACP Conversation - will revisit periodically  Reviewed DNR/DNI and patient elects Full Code (Attempt Resuscitation)    Length of Voluntary ACP Conversation in minutes:  <16 minutes (Non-Billable)    Jie Callaway  [phone]       10/18/24 1144   Service Assessment   Patient Orientation Alert and Oriented  (Periods of confusion but able to answer most questions)   Cognition Alert   History Provided By 
Pt is clear from CM standpoint for d/c on 11/5/24.    Medicaid transportation has been arrange for 11 am on 11/5/24.    Reservation number is [phone].    Trip number is [id number].    Report number to [facility] is [phone].    Transition of Care Plan:     RUR: 9%  Prior Level of Functioning: Unsure  Disposition: LTC  JIMMY: 11/5/24  If SNF or IPR: Date FOC offered:   Date FOC received:   Accepting facility:   Date authorization started with reference number:   Date authorization received and expires:   Follow up appointments: Defer to facility   DME needed: Defer to facility   Transportation at discharge: BLS  IM/IMM Medicare/ letter given: N/A-Medicaid   Is patient a  and connected with VA? No              If yes, was  transfer form completed and VA notified? No  Caregiver Contact: Pt's brother   Discharge Caregiver contacted prior to discharge? Pt was contacted   Care Conference needed? None   Barriers to discharge: None     CM spoke to pt and pt's brother lUisses Durham regarding that [facility] has accept pt and that pt is medically stable for d/c but it will not happen until tomorrow.     has arrange for transport through pt's insurance for BLS for 11 am tomorrow.       11/04/24 1400   Services At/After Discharge   Transition of Care Consult (CM Consult) Long Term Care   Services At/After Discharge Long term care    Resource Information Provided? No   Mode of Transport at Discharge BLS   Confirm Follow Up Transport Self   Condition of Participation: Discharge Planning   The Plan for Transition of Care is related to the following treatment goals: Goal is to go to [facility] for rehab/LTC   The Patient and/or Patient Representative was provided with a Choice of Provider? Patient;Patient Representative   Name of the Patient Representative who was provided with the Choice of Provider and agrees with the Discharge Plan?  Pt's brother, Ulisses Durham   The Patient and/Or Patient 
Pt is clear from CM standpoint for d/c.     Medicaid transportation has been arrange for 11 am.     Reservation number is [phone].     Trip number is [id number].     Report number to [facility] is [phone].     Transition of Care Plan:     RUR: 9%  Prior Level of Functioning: Unsure  Disposition: LTC  JIMMY: 11/5/24  If SNF or IPR: Date FOC offered:   Date FOC received:   Accepting facility:   Date authorization started with reference number:   Date authorization received and expires:   Follow up appointments: Defer to facility   DME needed: Defer to facility   Transportation at discharge: BLS  IM/IMM Medicare/ letter given: N/A-Medicaid   Is patient a [place] and connected with VA? No              If yes, was  transfer form completed and VA notified? No  Caregiver Contact: Pt's brother   Discharge Caregiver contacted prior to discharge? Pt was contacted   Care Conference needed? None   Barriers to discharge: None     Maddie Sanchez      
Transition of Care Plan:     RUR: 12% (low RUR)     Prior Level of Functioning: Unsure     Disposition: [facility] bed pending vs. return home with follow ups;   Substance resources left on AVS ([facility], [facility], [facility])  -CM exploring DC options and trying to work with Pt/wife.      :JIMMY: 10/29/2024-10/30/24?     If SNF or IPR: Date FOC offered: N/A  Date FOC received: N/A  Accepting facility: N/A  Date authorization started with reference number: N/A  Date authorization received and expires: N/A  Follow up appointments: PCP/specialists.  The following contact information listed on AVS for new PCP follow ups: [facility], [facility] and [facility].  [facility] contact information also listed on AVS.  DME needed: TBD.  Patient does not appear to have insurance coverage at this time.  Transportation at discharge: Family anticipated  IM/IMM Medicare/ letter given: N/A Pending Medicaid  Is patient a  and connected with VA? No.              If yes, was [place] transfer form completed and VA notified? N/A  Caregiver Contact: Kelly Galicia - spouse - [phone]  Discharge Caregiver contacted prior to discharge? CM to contact, if needed.  Care Conference needed? No.  Barriers to discharge: [facility] Bed    3:26 PM   CM provided [facility]off to [facility]     11:26 AM   CM participated in rounds and Pt is medically stable for transfer.   CM provided update to Brother today prior to rounds and he is in agreement for transfer to [facility].   Waiting for update from [facility] re transfer.      9:20 AM  CM/MD Care Conferenced with the wife over the phone and MD talked to Brother yesterday and they are in agreement for transfer to [facility] when bed is available.       CM let wife know that we are checking with [facility] and [facility] to have them screen for appropriateness for [facility] Bed with Medicaid 
Transition of Care Plan:    RUR: 12% (low RUR)  Prior Level of Functioning: Unsure  Disposition: [facility] ivy SNF bed pending vs. return home with follow ups; substance resources left on AVS ([facility], [facility], [facility])  If SNF or IPR: Date FOC offered: N/A  Date FOC received: N/A  Accepting facility: N/A  Date authorization started with reference number: N/A  Date authorization received and expires: N/A  Follow up appointments: PCP/specialists.  The following contact information listed on AVS for new PCP follow ups: [facility], [facility] and [facility].  [facility] contact information also listed on AVS.  DME needed: TBD.  Patient does not appear to have insurance coverage at this time.  Transportation at discharge: Family anticipated  IM/IMM Medicare/ letter given: N/A Self Pay  Is patient a  and connected with VA? No.   If yes, was  transfer form completed and VA notified? N/A  Caregiver Contact: Kelly Galicia - spouse - [phone]  Discharge Caregiver contacted prior to discharge? CM to contact, if needed.  Care Conference needed? No.  Barriers to discharge: CIWA, GI/uro clearance, [place]    1153 to 1155 - WERO spoke with attending during IDRs who verified that patient would need to return to his baseline to discharge home from SNF.  CM contacted wife to verify more of [facility]'s questions.  She is aware that CM is looking into SNF ivy beds for the patient. WERO sent information to [facility] to review.    8223 -  left with [facility] liaison regarding SNF ivy beds for Medicaid pending.      LUISA RobertoN, RN    Care Management  [phone]  
Transition of Care Plan:    RUR: 13% (low RUR)  Prior Level of Functioning: Unsure  Disposition: Return home with follow ups; substance resources left on AVS ([facility], [facility], [facility])  If SNF or IPR: Date FOC offered: N/A  Date FOC received: N/A  Accepting facility: N/A  Date authorization started with reference number: N/A  Date authorization received and expires: N/A  Follow up appointments: PCP/specialists.  The following contact information listed on AVS for new PCP follow ups: [facility], [facility] and [facility].  [facility] contact information also listed on AVS.  DME needed: TBD.  Patient does not appear to have insurance coverage at this time.  Transportation at discharge: Patient is unsure if he has keys or how he will transport home per previous CM note - need to clarify  IM/IMM Medicare/ letter given: N/A Self Pay  Is patient a  and connected with VA? No.   If yes, was [place] transfer form completed and VA notified? N/A  Caregiver Contact: Kelly Galicia - [facility] - [phone]  Discharge Caregiver contacted prior to discharge? CM to contact, if needed.  Care Conference needed? No.  Barriers to discharge: CIWA, GI/uro clearance      LUISA RobertoN, RN    Care Management  [phone]  
Transition of Care Plan:    RUR: 14% (low RUR)  Prior Level of Functioning: Unsure  Disposition: Return home with follow ups; substance resources left on AVS ([facility], [facility], [facility] and [facility])  If SNF or IPR: Date FOC offered: N/A  Date FOC received: N/A  Accepting facility: N/A  Date authorization started with reference number: N/A  Date authorization received and expires: N/A  Follow up appointments: PCP/specialists.  The following contact information listed on AVS for new PCP follow ups: [facility], [facility] and [facility].  [facility] contact information also listed on AVS.  DME needed: TBD.  Patient does not appear to have insurance coverage at this time.  Transportation at discharge: Patient is unsure if he has keys or how he will transport home per previous CM note - need to clarify  IM/IMM Medicare/ letter given: N/A Self Pay  Is patient a  and connected with VA? No.   If yes, was [place] transfer form completed and VA notified? N/A  Caregiver Contact: Kelly Galicia - [facility] - [phone]  Discharge Caregiver contacted prior to discharge? CM to contact, if needed.  Care Conference needed? No.  Barriers to discharge: CIWA, GI/uro clearance, Hgb stability      LUISA RobertoN, RN    Care Management  [phone]  
Transition of Care Plan:    RUR: 5%  Prior Level of Functioning: Unsure  Disposition: H Bed pending or SNF   JIMMY: 10/31/24  If SNF or IPR: Date FOC offered:   Date FOC received:   Accepting facility:   Date authorization started with reference number:   Date authorization received and expires:   Follow up appointments: Defer to facility   DME needed: Defer to facility   Transportation at discharge: Family anticipated   IM/IMM Medicare/ letter given: N/A-Medicaid pending   Is patient a  and connected with VA? No   If yes, was [place] transfer form completed and VA notified? No  Caregiver Contact: Pt's brother   Discharge Caregiver contacted prior to discharge? Pt will be contacted   Care Conference needed? None   Barriers to discharge: Placement      CM updated pt's spouse, Kelly Galicia and pt's brother, Ulisses Durham that [facility] and [facility] has decline pt for inpatient rehab.    CM informed them that CM is still waiting on a bed at [facility] but will need to have other options if [facility] does not accept pt.    CM informed them that their other options is for pt to return home or CM sends out referral to SNF.    CM was informed by pt's spouse to speak to pt's brother, which CM did speak to pt's brother.    Pt's brother informed CM that he and pt's other siblings are unable to assist pt.    CM stated that she understood and then the option for them if [facility] declines is for SNF.     Pt's brother was agreeable for referral to be sent to SNF.    CM explain to pt's brother that due to pt is Medicaid pending that their will be limited option for SNF.     Pt's brother stated he understood. Pt's brother asked if CM could send referral to [place] or [facility]. CM stated to pt's brother that CM will send the referral to that area but CM can't guarantee a facility will accept.    CM will continue to follow and assist with d/c planning.     Maddie Sanchez         
    Care Management  [phone]

## 2024-11-05 NOTE — DISCHARGE SUMMARY
possible for a visit  Please contact to make an appointment with a primary care provider.          Total time in minutes spent coordinating this discharge (includes going over instructions, follow-up, prescriptions, and preparing report for sign off to her PCP) :  35 minutes

## 2024-11-07 ENCOUNTER — TELEPHONE (OUTPATIENT)
Age: 64
End: 2024-11-07

## 2024-11-07 ENCOUNTER — OFFICE VISIT (OUTPATIENT)
Facility: CLINIC | Age: 64
End: 2024-11-07

## 2024-11-07 DIAGNOSIS — E43 SEVERE PROTEIN-CALORIE MALNUTRITION (HCC): ICD-10-CM

## 2024-11-07 DIAGNOSIS — F10.931 ALCOHOL WITHDRAWAL SEIZURE WITH DELIRIUM (HCC): ICD-10-CM

## 2024-11-07 DIAGNOSIS — R56.9 ALCOHOL WITHDRAWAL SEIZURE WITH DELIRIUM (HCC): ICD-10-CM

## 2024-11-07 DIAGNOSIS — K21.01 GASTROESOPHAGEAL REFLUX DISEASE WITH ESOPHAGITIS AND HEMORRHAGE: Primary | Chronic | ICD-10-CM

## 2024-11-07 DIAGNOSIS — F10.10 ALCOHOL ABUSE: Chronic | ICD-10-CM

## 2024-11-08 ENCOUNTER — OFFICE VISIT (OUTPATIENT)
Facility: CLINIC | Age: 64
End: 2024-11-08

## 2024-11-08 VITALS — RESPIRATION RATE: 16 BRPM | HEART RATE: 72 BPM | DIASTOLIC BLOOD PRESSURE: 85 MMHG | SYSTOLIC BLOOD PRESSURE: 130 MMHG

## 2024-11-08 DIAGNOSIS — F10.10 ALCOHOL ABUSE: Chronic | ICD-10-CM
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
## 2024-11-08 PROBLEM — S93.401A RIGHT ANKLE SPRAIN: Status: RESOLVED | Noted: 2024-11-01 | Resolved: 2024-11-08

## 2024-11-09 NOTE — PROGRESS NOTES
appetite change  Skin:  Denies rashes or suspicious skin lesions  HEENT: Denies visual disturbance, earache, sore throat, post-nasal drainage, hoarseness dysphagia  Cardiac: Denies chest pain, palpitations, DE JESUS, orthopnea, PND, edema  Pulmonary: Denies cough, hemoptysis, wheezing, SOB  GI:  He has occasional heartburn, he denies nausea, vomiting, diarrhea, hematemesis, melena, abdominal pain  :  Denies dysuria, frequency, urgency, incontinence  MS:  Denies joint pain, swelling, stiffness, myalgia, back pain  Neurologic: Denies weakness, paresthesias, headache, syncope, seizure  Psychiatric: He is anxious      Past Medical History:  Past Medical History:   Diagnosis Date    Alcohol abuse        Past Surgical History:  Past Surgical History:   Procedure Laterality Date    UPPER GASTROINTESTINAL ENDOSCOPY N/A 10/18/2024    ESOPHAGOGASTRODUODENOSCOPY performed by Sukumar Ramos MD at [facility] ENDOSCOPY       Family History:  No family history on file.    Allergies:  No Known Allergies    Social History:  Social History     Tobacco Use    Smoking status: Every Day     Types: Cigarettes   Vaping Use    Vaping status: Unknown   Substance Use Topics    Alcohol use: Yes    Drug use: Never       Current Medications:  Current Outpatient Medications on File Prior to Visit   Medication Sig Dispense Refill    folic acid (FOLVITE) 1 MG tablet Take 1 tablet by mouth daily 30 tablet 3    thiamine mononitrate (THIAMINE) 100 MG tablet Take 1 tablet by mouth daily 30 tablet 0    pantoprazole (PROTONIX) 40 MG tablet Take 1 tablet by mouth 2 times daily (before meals) 30 tablet 3    magnesium oxide (MAG-OX) 400 MG tablet Take 1 tablet by mouth 2 times daily 30 tablet 1     No current facility-administered medications on file prior to visit.       The patient's problem list, medication list, allergies, past medical history, family history and social history have been reviewed and updated during today's visit.      OBJECTIVE:    Vital
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
## 2024-11-11 ENCOUNTER — OFFICE VISIT (OUTPATIENT)
Facility: CLINIC | Age: 64
End: 2024-11-11
Payer: MEDICAID

## 2024-11-11 DIAGNOSIS — F10.10 ALCOHOL ABUSE: Primary | Chronic | ICD-10-CM
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
## 2024-11-11 PROCEDURE — 99308 SBSQ NF CARE LOW MDM 20: CPT | Performed by: NURSE PRACTITIONER

## 2024-11-12 ENCOUNTER — OFFICE VISIT (OUTPATIENT)
Facility: CLINIC | Age: 64
End: 2024-11-12

## 2024-11-12 DIAGNOSIS — Z76.89 ENCOUNTER FOR SOCIAL WORK INTERVENTION: Primary | ICD-10-CM

## 2024-11-12 NOTE — PROGRESS NOTES
Social Work Assessment    Date of referral: 11/12/24  Referral received from: Sienna Tee NP  Reason for referral: Assess needs    Patient informed NP a day prior that he was not going to participate in therapy and was going to discharge home.  MSW met with Mr. Durham who was sitting up in bed finishing his breakfast.  Patient stated that \"a doctor\" met with him earlier today and convinced him to try therapy.  Mr. Durham stated that he was provided a boot for his foot that makes him feel better.  Patient asked MSW to meet with him next week to assess how he was feeling after a week of therapy.   Mr. Durham stated that his home has stairs and his wife expressed concern about his safety.    Social history    Living situation prior to SNF:  Mr. Durham stated that he resides in a home that has stairs.       Anticipated living situation following discharge:      Marital status: [] Single   []   []     []    [] Life Partner   [] Other      Family support:      Previous career:     Was assistance needed for ADLs prior to SNF?  [] Yes   [] No  Additional notes:    Spirituality/Islam affiliation:   Additional notes:    Is patient a :          [] Yes    []  No  Additional notes:    Advance Care Plan:          [] Yes   [] No  Additional notes:    Housing:   Are you concerned about housing?                                  [] Yes   [] No  Additional notes:    Food:  Can you afford nutritious meals?                                     [] Yes   [] No  Are you able to prepare your own meals?                       [] Yes   [] No  Are you able to access food?                                           [] Yes   [] No  Additional notes:    Financial:   Do you manage your own finances?                               [] Yes   []  No  Can you afford your household expenses?                    [] Yes   [] No  Can you afford your medical copays?                             [] Yes   []

## 2024-11-12 NOTE — PROGRESS NOTES
PLACE OF SERVICE:  [facility] [address] [place] [address]       SKILLED VISIT      Chief Complaint:  ETOH abuse S/P fall resulting in sprained right foot       HPI : Getachew Durham is a 64 y.o. male patient seen today for follow up. Patient awake and responsive able to make his needs known. Denies S/S of respiratory distress, lung clear upon assessment. Patient C/O headache, new order for tylenol 650 mg every 6 hours. Slight swelling noted tight foot. Patient stated swelling has improved since his admission to facility, patient ambulates with aid of boot. As per patient he have worked with PT since admission. Unit manager notified and reported that therapy stated patient has refused evaluation and therapy session. He continues on multiple supplements including Magnesium Oxide 400 mg twice a day,Thiamine 100 mg daily and  Folic acid 1 mg daily. He also remains stable on Protonix 40 mg for Acute GI bleed. Patient remains hemMagnesium Oxide 400 mg twice a day,Thiamine 100 mg daily and  Folic acid 1 mg daily.odynamically stable. No further concerns reported. Will continue to monitor closely. Labs will be collected for CBC and CMP on Monday 11/11/2024 11/11/2024  Patient met in bed watching TV, he is alert and oriented to make his needs known. Patient requesting to be discharged from facility secondary to not receiving physical therapy. Patient believes that he does not need physical therapy and therefore there is no reason for him to stay in facility. Spoke with  regarding patient's request. As per  concern voiced from patient sister-in-law regarding his drinking and her belief that the patient is depressed. Patient is notified scratch that patient notified assistant was concerned of depression and is drinking habit, patient states that he does not have a problem with drinking or does not feel depressed. Patient states that he can be safely

## 2024-11-12 NOTE — ASSESSMENT & PLAN NOTE
Patient continues on Magnesium Oxide 400 mg twice a day,Thiamine 100 mg daily and  Folic acid 1 mg daily

## 2024-12-06 ENCOUNTER — OFFICE VISIT (OUTPATIENT)
Facility: CLINIC | Age: 64
End: 2024-12-06
Payer: MEDICAID
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
## 2024-12-06 DIAGNOSIS — K21.01 GASTROESOPHAGEAL REFLUX DISEASE WITH ESOPHAGITIS AND HEMORRHAGE: ICD-10-CM

## 2024-12-06 DIAGNOSIS — F10.10 ALCOHOL ABUSE: Primary | Chronic | ICD-10-CM

## 2024-12-06 PROCEDURE — 99315 NF DSCHRG MGMT 30 MIN/LESS: CPT | Performed by: NURSE PRACTITIONER

## 2024-12-06 RX ORDER — ACETAMINOPHEN 325 MG/1
650 TABLET ORAL EVERY 6 HOURS PRN
Qty: 120 TABLET | Refills: 0 | Status: SHIPPED | OUTPATIENT
Start: 2024-12-06 | End: 2025-01-05

## 2024-12-06 RX ORDER — FOLIC ACID 1 MG/1
1 TABLET ORAL DAILY
Qty: 30 TABLET | Refills: 0 | Status: SHIPPED | OUTPATIENT
Start: 2024-12-06 | End: 2025-01-05

## 2024-12-06 RX ORDER — THIAMINE MONONITRATE (VIT B1) 100 MG
100 TABLET ORAL DAILY
Qty: 30 TABLET | Refills: 0 | Status: SHIPPED | OUTPATIENT
Start: 2024-12-06 | End: 2025-01-05

## 2024-12-06 RX ORDER — MAGNESIUM OXIDE 400 MG/1
400 TABLET ORAL 2 TIMES DAILY
Qty: 60 TABLET | Refills: 0 | Status: SHIPPED | OUTPATIENT
Start: 2024-12-06

## 2024-12-06 RX ORDER — PANTOPRAZOLE SODIUM 40 MG/1
40 TABLET, DELAYED RELEASE ORAL
Qty: 60 TABLET | Refills: 0 | Status: SHIPPED | OUTPATIENT
Start: 2024-12-06

## 2024-12-09 PROBLEM — K21.01 GASTROESOPHAGEAL REFLUX DISEASE WITH ESOPHAGITIS AND HEMORRHAGE: Status: ACTIVE | Noted: 2024-12-09

## 2024-12-09 NOTE — PROGRESS NOTES
Place of Service:  [facility] [address] [place] [address]                                                                        Discharge Summary       Admit Dx:  ETOH abuse S/P fall resulting in sprained right foot     Disposition: Patient to scheduled for discharge on 12/6/2024.  As per system no patient will be admitted to a drug treatment [facility] on Monday/9/2024.  Discharge orders and medications sent to preferred pharmacy of choice.      Presentation:  Getachew Durham is a 64 y.o. male who presents for admission to [facility] following a recent admission to the hospital for several medical issues including Esophagitis with Hemorrhage. He denies any further GI sx.      The patient has a number of chronic medical problems as listed above.     The patient requires Skilled Nursing Facility care for rehabilitation due to multiple medical issues as noted.      The history is obtained from the patient, family and previous records and is felt to be satisfactory to establish an acceptable plan of care. Health status indicators were reviewed and there are no changes except as noted above. The past medical history, family history, social history and ethnic considerations have been updated as needed. The patient denies any constitutional, ENT, cardiopulmonary, gastrointestinal, or genitourinary issues otherwise as review in the ROS.       Discharge time : Discharge time scheduled on or around 11 AM    Brief SNF Course:  This is an 64 y.o. male scheduled for discharge home on 6/3/2024. Patient awake and responsive able to make his needs known. Denies S/S of respiratory distress, lung clear upon assessment. No complaint of pain or discomfort reported he continues on Tylenol 650 mg every 6 hours as needed for pain. Patient admitted to facility with pain and swelling to right foot, swelling improved over time patient is able to ambulate safely. Patient refused
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
## 2024-12-10 NOTE — ASSESSMENT & PLAN NOTE
Patient continues on Magnesium Oxide 400 mg twice a day,Thiamine 100 mg daily and Folic acid 1 mg charles

## 2025-02-04 ENCOUNTER — TRANSCRIBE ORDERS (OUTPATIENT)
Facility: HOSPITAL | Age: 65
End: 2025-02-04

## 2025-02-04 DIAGNOSIS — Z12.2 ENCOUNTER FOR SCREENING FOR MALIGNANT NEOPLASM OF RESPIRATORY ORGANS: Primary | ICD-10-CM

## 2025-02-04 DIAGNOSIS — F17.200 CURRENT SMOKER: ICD-10-CM

## 2025-03-19 ENCOUNTER — HOSPITAL ENCOUNTER (OUTPATIENT)
Facility: HOSPITAL | Age: 65
Discharge: HOME | End: 2025-03-22
Payer: MEDICAID

## 2025-03-19 DIAGNOSIS — F17.200 CURRENT SMOKER: ICD-10-CM

## 2025-03-19 DIAGNOSIS — Z12.2 ENCOUNTER FOR SCREENING FOR MALIGNANT NEOPLASM OF RESPIRATORY ORGANS: ICD-10-CM

## 2025-03-19 PROCEDURE — 71271 CT THORAX LUNG CANCER SCR C-: CPT

## (undated) DEVICE — IV START KIT: Brand: MEDLINE

## (undated) DEVICE — SET GRAV CK VLV NEEDLESS ST 3 GANGED 4WAY STPCOCK HI FLO 10

## (undated) DEVICE — COVIDIEN KENDALL DL DISPOSABLE 3 LEAD SY: Brand: MEDLINE RENEWAL